# Patient Record
Sex: MALE | Race: ASIAN | NOT HISPANIC OR LATINO | URBAN - METROPOLITAN AREA
[De-identification: names, ages, dates, MRNs, and addresses within clinical notes are randomized per-mention and may not be internally consistent; named-entity substitution may affect disease eponyms.]

---

## 2017-04-12 ENCOUNTER — OUTPATIENT (OUTPATIENT)
Dept: OUTPATIENT SERVICES | Facility: HOSPITAL | Age: 62
LOS: 1 days | Discharge: HOME | End: 2017-04-12
Payer: MEDICAID

## 2017-04-12 DIAGNOSIS — Z41.9 ENCOUNTER FOR PROCEDURE FOR PURPOSES OTHER THAN REMEDYING HEALTH STATE, UNSPECIFIED: Chronic | ICD-10-CM

## 2017-04-12 DIAGNOSIS — R06.00 DYSPNEA, UNSPECIFIED: ICD-10-CM

## 2017-04-12 LAB
BUN SERPL-MCNC: 32 MG/DL — HIGH (ref 7–23)
CALCIUM SERPL-MCNC: 9.6 MG/DL — SIGNIFICANT CHANGE UP (ref 8.4–10.5)
CHLORIDE SERPL-SCNC: 104 MMOL/L — SIGNIFICANT CHANGE UP (ref 98–107)
CO2 SERPL-SCNC: 25 MMOL/L — SIGNIFICANT CHANGE UP (ref 22–31)
CREAT SERPL-MCNC: 1.32 MG/DL — HIGH (ref 0.5–1.3)
GLUCOSE SERPL-MCNC: 73 MG/DL — SIGNIFICANT CHANGE UP (ref 70–99)
HBA1C BLD-MCNC: 5.5 % — SIGNIFICANT CHANGE UP (ref 4–5.6)
HCT VFR BLD CALC: 41.6 % — SIGNIFICANT CHANGE UP (ref 39–50)
HGB BLD-MCNC: 13.5 G/DL — SIGNIFICANT CHANGE UP (ref 13–17)
MCHC RBC-ENTMCNC: 28 PG — SIGNIFICANT CHANGE UP (ref 27–34)
MCHC RBC-ENTMCNC: 32.5 % — SIGNIFICANT CHANGE UP (ref 32–36)
MCV RBC AUTO: 86.3 FL — SIGNIFICANT CHANGE UP (ref 80–100)
PLATELET # BLD AUTO: 214 K/UL — SIGNIFICANT CHANGE UP (ref 150–400)
PMV BLD: 9.8 FL — SIGNIFICANT CHANGE UP (ref 7–13)
POTASSIUM SERPL-MCNC: 4.2 MMOL/L — SIGNIFICANT CHANGE UP (ref 3.5–5.3)
POTASSIUM SERPL-SCNC: 4.2 MMOL/L — SIGNIFICANT CHANGE UP (ref 3.5–5.3)
RBC # BLD: 4.82 M/UL — SIGNIFICANT CHANGE UP (ref 4.2–5.8)
RBC # FLD: 15.7 % — HIGH (ref 10.3–14.5)
SODIUM SERPL-SCNC: 143 MMOL/L — SIGNIFICANT CHANGE UP (ref 135–145)
WBC # BLD: 8.77 K/UL — SIGNIFICANT CHANGE UP (ref 3.8–10.5)
WBC # FLD AUTO: 8.77 K/UL — SIGNIFICANT CHANGE UP (ref 3.8–10.5)

## 2017-04-12 PROCEDURE — 93458 L HRT ARTERY/VENTRICLE ANGIO: CPT | Mod: 26

## 2017-04-12 PROCEDURE — 93010 ELECTROCARDIOGRAM REPORT: CPT

## 2017-04-12 RX ORDER — SODIUM CHLORIDE 9 MG/ML
3 INJECTION INTRAMUSCULAR; INTRAVENOUS; SUBCUTANEOUS EVERY 8 HOURS
Qty: 0 | Refills: 0 | Status: DISCONTINUED | OUTPATIENT
Start: 2017-04-12 | End: 2017-04-27

## 2017-04-12 RX ORDER — SODIUM CHLORIDE 9 MG/ML
500 INJECTION INTRAMUSCULAR; INTRAVENOUS; SUBCUTANEOUS
Qty: 0 | Refills: 0 | Status: DISCONTINUED | OUTPATIENT
Start: 2017-04-12 | End: 2017-04-27

## 2017-04-12 RX ORDER — DEXTROSE 50 % IN WATER 50 %
1 SYRINGE (ML) INTRAVENOUS ONCE
Qty: 0 | Refills: 0 | Status: DISCONTINUED | OUTPATIENT
Start: 2017-04-12 | End: 2017-04-27

## 2017-04-12 RX ORDER — DEXTROSE 50 % IN WATER 50 %
25 SYRINGE (ML) INTRAVENOUS ONCE
Qty: 0 | Refills: 0 | Status: DISCONTINUED | OUTPATIENT
Start: 2017-04-12 | End: 2017-04-27

## 2017-04-12 RX ORDER — GLUCAGON INJECTION, SOLUTION 0.5 MG/.1ML
1 INJECTION, SOLUTION SUBCUTANEOUS ONCE
Qty: 0 | Refills: 0 | Status: DISCONTINUED | OUTPATIENT
Start: 2017-04-12 | End: 2017-04-27

## 2017-04-12 RX ORDER — DEXTROSE 50 % IN WATER 50 %
12.5 SYRINGE (ML) INTRAVENOUS ONCE
Qty: 0 | Refills: 0 | Status: DISCONTINUED | OUTPATIENT
Start: 2017-04-12 | End: 2017-04-27

## 2017-04-12 RX ORDER — DEXTROSE 50 % IN WATER 50 %
25 SYRINGE (ML) INTRAVENOUS ONCE
Qty: 0 | Refills: 0 | Status: COMPLETED | OUTPATIENT
Start: 2017-04-12 | End: 2017-04-12

## 2017-04-12 RX ORDER — SODIUM CHLORIDE 9 MG/ML
1000 INJECTION, SOLUTION INTRAVENOUS
Qty: 0 | Refills: 0 | Status: DISCONTINUED | OUTPATIENT
Start: 2017-04-12 | End: 2017-04-27

## 2017-04-12 RX ORDER — INSULIN LISPRO 100/ML
VIAL (ML) SUBCUTANEOUS
Qty: 0 | Refills: 0 | Status: DISCONTINUED | OUTPATIENT
Start: 2017-04-12 | End: 2017-04-27

## 2017-04-12 RX ADMIN — Medication 25 MILLILITER(S): at 14:26

## 2017-04-12 RX ADMIN — SODIUM CHLORIDE 75 MILLILITER(S): 9 INJECTION INTRAMUSCULAR; INTRAVENOUS; SUBCUTANEOUS at 17:30

## 2017-04-12 NOTE — H&P CARDIOLOGY - HISTORY OF PRESENT ILLNESS
61 year old male with HTN, hyperlipidemia, DM-II, known CAD with drug eluting stent mRCA 2016 who presented to his Cardiologist complaining of chest pain and SOB for the past 4months. Patient describes his symptoms as non-radiating chest squeezing walking up a flight of stairs, relieved with rest. Admits to an increase in fatigue and decrease in exercise tolerance.  Denies dizziness, syncope, edema, orthopnea.   Underwent a Nuclear Stress test with reported abnormal results.   In light of patients CAD history, symptoms and abnormal noninvasive test findings there is high suspicion for CAD progression. Patient is now referred to [facility] for a cardiac catheterization with possible PTCA/stent.

## 2017-04-12 NOTE — H&P CARDIOLOGY - PMH
CAD (coronary artery disease)    DM (diabetes mellitus)    Essential hypertension    Other hyperlipidemia    Stented coronary artery

## 2019-08-23 ENCOUNTER — OUTPATIENT (OUTPATIENT)
Dept: OUTPATIENT SERVICES | Facility: HOSPITAL | Age: 64
LOS: 1 days | Discharge: HOME | End: 2019-08-23
Payer: MEDICAID

## 2019-08-23 DIAGNOSIS — Z41.9 ENCOUNTER FOR PROCEDURE FOR PURPOSES OTHER THAN REMEDYING HEALTH STATE, UNSPECIFIED: Chronic | ICD-10-CM

## 2019-08-23 DIAGNOSIS — R94.39 ABNORMAL RESULT OF OTHER CARDIOVASCULAR FUNCTION STUDY: ICD-10-CM

## 2019-08-23 PROBLEM — Z82.3 FAMILY HISTORY OF CEREBROVASCULAR ACCIDENT (CVA): Status: ACTIVE | Noted: 2019-08-23

## 2019-08-23 PROBLEM — I25.10 CORONARY ARTERY DISEASE: Status: ACTIVE | Noted: 2019-08-23

## 2019-08-23 LAB
ANION GAP SERPL CALC-SCNC: 15 MMO/L — HIGH (ref 7–14)
BUN SERPL-MCNC: 22 MG/DL — SIGNIFICANT CHANGE UP (ref 7–23)
CALCIUM SERPL-MCNC: 9.3 MG/DL — SIGNIFICANT CHANGE UP (ref 8.4–10.5)
CHLORIDE SERPL-SCNC: 104 MMOL/L — SIGNIFICANT CHANGE UP (ref 98–107)
CO2 SERPL-SCNC: 20 MMOL/L — LOW (ref 22–31)
CREAT SERPL-MCNC: 1.1 MG/DL — SIGNIFICANT CHANGE UP (ref 0.5–1.3)
GLUCOSE BLDC GLUCOMTR-MCNC: 123 MG/DL — HIGH (ref 70–99)
GLUCOSE SERPL-MCNC: 132 MG/DL — HIGH (ref 70–99)
HBA1C BLD-MCNC: 6.1 % — HIGH (ref 4–5.6)
HCT VFR BLD CALC: 44.7 % — SIGNIFICANT CHANGE UP (ref 39–50)
HGB BLD-MCNC: 14.2 G/DL — SIGNIFICANT CHANGE UP (ref 13–17)
MCHC RBC-ENTMCNC: 27.6 PG — SIGNIFICANT CHANGE UP (ref 27–34)
MCHC RBC-ENTMCNC: 31.8 % — LOW (ref 32–36)
MCV RBC AUTO: 86.8 FL — SIGNIFICANT CHANGE UP (ref 80–100)
NRBC # FLD: 0 K/UL — SIGNIFICANT CHANGE UP (ref 0–0)
PLATELET # BLD AUTO: 208 K/UL — SIGNIFICANT CHANGE UP (ref 150–400)
PMV BLD: 9.3 FL — SIGNIFICANT CHANGE UP (ref 7–13)
POTASSIUM SERPL-MCNC: 4.4 MMOL/L — SIGNIFICANT CHANGE UP (ref 3.5–5.3)
POTASSIUM SERPL-SCNC: 4.4 MMOL/L — SIGNIFICANT CHANGE UP (ref 3.5–5.3)
RBC # BLD: 5.15 M/UL — SIGNIFICANT CHANGE UP (ref 4.2–5.8)
RBC # FLD: 14.1 % — SIGNIFICANT CHANGE UP (ref 10.3–14.5)
SODIUM SERPL-SCNC: 139 MMOL/L — SIGNIFICANT CHANGE UP (ref 135–145)
WBC # BLD: 6.92 K/UL — SIGNIFICANT CHANGE UP (ref 3.8–10.5)
WBC # FLD AUTO: 6.92 K/UL — SIGNIFICANT CHANGE UP (ref 3.8–10.5)

## 2019-08-23 PROCEDURE — 93571 IV DOP VEL&/PRESS C FLO 1ST: CPT | Mod: 26,LD

## 2019-08-23 PROCEDURE — 93572 IV DOP VEL&/PRESS C FLO EA: CPT | Mod: 26,RC

## 2019-08-23 PROCEDURE — 93010 ELECTROCARDIOGRAM REPORT: CPT | Mod: 76

## 2019-08-23 PROCEDURE — 99152 MOD SED SAME PHYS/QHP 5/>YRS: CPT

## 2019-08-23 PROCEDURE — 93458 L HRT ARTERY/VENTRICLE ANGIO: CPT | Mod: 26

## 2019-08-23 RX ORDER — ASPIRIN/CALCIUM CARB/MAGNESIUM 324 MG
81 TABLET ORAL ONCE
Refills: 0 | Status: COMPLETED | OUTPATIENT
Start: 2019-08-23 | End: 2019-08-23

## 2019-08-23 RX ORDER — METOPROLOL TARTRATE 50 MG
1 TABLET ORAL
Qty: 30 | Refills: 0 | DISCHARGE

## 2019-08-23 RX ORDER — SODIUM CHLORIDE 9 MG/ML
3 INJECTION INTRAMUSCULAR; INTRAVENOUS; SUBCUTANEOUS EVERY 8 HOURS
Refills: 0 | Status: DISCONTINUED | OUTPATIENT
Start: 2019-08-23 | End: 2019-09-11

## 2019-08-23 RX ORDER — CLOPIDOGREL BISULFATE 75 MG/1
75 TABLET, FILM COATED ORAL ONCE
Refills: 0 | Status: COMPLETED | OUTPATIENT
Start: 2019-08-23 | End: 2019-08-23

## 2019-08-23 RX ADMIN — CLOPIDOGREL BISULFATE 75 MILLIGRAM(S): 75 TABLET, FILM COATED ORAL at 09:33

## 2019-08-23 RX ADMIN — Medication 81 MILLIGRAM(S): at 09:31

## 2019-08-23 RX ADMIN — CLOPIDOGREL BISULFATE 75 MILLIGRAM(S): 75 TABLET, FILM COATED ORAL at 09:41

## 2019-08-23 NOTE — H&P CARDIOLOGY - RS GEN PE MLT RESP DETAILS PC
respirations non-labored/airway patent/no chest wall tenderness/clear to auscultation bilaterally/breath sounds equal/good air movement

## 2019-08-23 NOTE — H&P CARDIOLOGY - HISTORY OF PRESENT ILLNESS
62 y/o M w/ PMH of HTN, HLD, DM-II, GERD and CAD s/p STEPHANE(mRCA 2016) presents for cardiac catheretization. Pt states that he has been experiencing exertional chest pain dyspnea for the past 3 months. Pt's symptoms are relived with a few minutes of rest. Pt was sent for a stress test which was positive for ischemia in the lateral, inferolateral, apical and apical inferior walls with EF 53%. Pt denies N/V/D, fevers, chills, cough, palpitations, syncope, orthopnea, nocturnal paroxysmal dyspnea, edema, cyanosis, heart murmurs, varicosities, phlebitis, claudication.

## 2019-08-26 ENCOUNTER — APPOINTMENT (OUTPATIENT)
Dept: CARDIOTHORACIC SURGERY | Facility: CLINIC | Age: 64
End: 2019-08-26
Payer: MEDICAID

## 2019-08-26 ENCOUNTER — OUTPATIENT (OUTPATIENT)
Dept: OUTPATIENT SERVICES | Facility: HOSPITAL | Age: 64
LOS: 1 days | End: 2019-08-26
Payer: MEDICAID

## 2019-08-26 VITALS
DIASTOLIC BLOOD PRESSURE: 85 MMHG | RESPIRATION RATE: 18 BRPM | WEIGHT: 175.93 LBS | TEMPERATURE: 99 F | HEART RATE: 87 BPM | SYSTOLIC BLOOD PRESSURE: 143 MMHG | OXYGEN SATURATION: 98 % | HEIGHT: 65 IN

## 2019-08-26 DIAGNOSIS — I25.10 ATHEROSCLEROTIC HEART DISEASE OF NATIVE CORONARY ARTERY W/OUT ANGINA PECTORIS: ICD-10-CM

## 2019-08-26 DIAGNOSIS — N49.2 INFLAMMATORY DISORDERS OF SCROTUM: Chronic | ICD-10-CM

## 2019-08-26 DIAGNOSIS — I25.10 ATHEROSCLEROTIC HEART DISEASE OF NATIVE CORONARY ARTERY WITHOUT ANGINA PECTORIS: ICD-10-CM

## 2019-08-26 DIAGNOSIS — Z98.890 OTHER SPECIFIED POSTPROCEDURAL STATES: Chronic | ICD-10-CM

## 2019-08-26 DIAGNOSIS — Z41.9 ENCOUNTER FOR PROCEDURE FOR PURPOSES OTHER THAN REMEDYING HEALTH STATE, UNSPECIFIED: Chronic | ICD-10-CM

## 2019-08-26 DIAGNOSIS — E11.9 TYPE 2 DIABETES MELLITUS WITHOUT COMPLICATIONS: ICD-10-CM

## 2019-08-26 DIAGNOSIS — Z82.3 FAMILY HISTORY OF STROKE: ICD-10-CM

## 2019-08-26 DIAGNOSIS — Z29.9 ENCOUNTER FOR PROPHYLACTIC MEASURES, UNSPECIFIED: ICD-10-CM

## 2019-08-26 LAB
ANION GAP SERPL CALC-SCNC: 14 MMOL/L — SIGNIFICANT CHANGE UP (ref 5–17)
BLD GP AB SCN SERPL QL: NEGATIVE — SIGNIFICANT CHANGE UP
BUN SERPL-MCNC: 25 MG/DL — HIGH (ref 7–23)
CALCIUM SERPL-MCNC: 10 MG/DL — SIGNIFICANT CHANGE UP (ref 8.4–10.5)
CHLORIDE SERPL-SCNC: 103 MMOL/L — SIGNIFICANT CHANGE UP (ref 96–108)
CO2 SERPL-SCNC: 23 MMOL/L — SIGNIFICANT CHANGE UP (ref 22–31)
CREAT SERPL-MCNC: 1.25 MG/DL — SIGNIFICANT CHANGE UP (ref 0.5–1.3)
GLUCOSE SERPL-MCNC: 110 MG/DL — HIGH (ref 70–99)
PA ADP PRP-ACNC: 102 PRU
POTASSIUM SERPL-MCNC: 4.4 MMOL/L — SIGNIFICANT CHANGE UP (ref 3.5–5.3)
POTASSIUM SERPL-SCNC: 4.4 MMOL/L — SIGNIFICANT CHANGE UP (ref 3.5–5.3)
RH IG SCN BLD-IMP: POSITIVE — SIGNIFICANT CHANGE UP
SODIUM SERPL-SCNC: 140 MMOL/L — SIGNIFICANT CHANGE UP (ref 135–145)

## 2019-08-26 PROCEDURE — 71046 X-RAY EXAM CHEST 2 VIEWS: CPT

## 2019-08-26 PROCEDURE — 80048 BASIC METABOLIC PNL TOTAL CA: CPT

## 2019-08-26 PROCEDURE — G0463: CPT

## 2019-08-26 PROCEDURE — 71046 X-RAY EXAM CHEST 2 VIEWS: CPT | Mod: 26

## 2019-08-26 PROCEDURE — 86850 RBC ANTIBODY SCREEN: CPT

## 2019-08-26 PROCEDURE — 99202 OFFICE O/P NEW SF 15 MIN: CPT

## 2019-08-26 PROCEDURE — 87640 STAPH A DNA AMP PROBE: CPT

## 2019-08-26 PROCEDURE — 86901 BLOOD TYPING SEROLOGIC RH(D): CPT

## 2019-08-26 PROCEDURE — 86900 BLOOD TYPING SEROLOGIC ABO: CPT

## 2019-08-26 PROCEDURE — 87641 MR-STAPH DNA AMP PROBE: CPT

## 2019-08-26 NOTE — H&P PST ADULT - NSICDXPASTMEDICALHX_GEN_ALL_CORE_FT
PAST MEDICAL HISTORY:  CAD (coronary artery disease)     DM (diabetes mellitus)     Essential hypertension     Other hyperlipidemia     Stented coronary artery PAST MEDICAL HISTORY:  CAD (coronary artery disease) 2 stents - 2016    DM (diabetes mellitus)     Essential hypertension     Other hyperlipidemia     Stented coronary artery

## 2019-08-26 NOTE — H&P PST ADULT - NSICDXPASTSURGICALHX_GEN_ALL_CORE_FT
PAST SURGICAL HISTORY:  Elective surgical procedure RLE metal jessica in place- 2006    S/P cardiac cath 8/23/19    Scrotal abscess s/p I& D- 2017

## 2019-08-26 NOTE — H&P PST ADULT - RS GEN PE MLT RESP DETAILS PC
airway patent/no chest wall tenderness/respirations non-labored/good air movement/clear to auscultation bilaterally/breath sounds equal

## 2019-08-26 NOTE — H&P PST ADULT - HISTORY OF PRESENT ILLNESS
62 y/o M w/ PMH of HTN, HLD, DM-II, GERD and CAD s/p STEPHANE (mRCA 2016)  Pt states that he has been experiencing exertional chest pain dyspnea for the past 3 months. Pt's symptoms are relived with a few minutes of rest. Pt was sent for a stress test which was positive for ischemia in the lateral, inferolateral, apical and apical inferior walls with EF 53%. Pt denies N/V/D, fevers, chills, cough, palpitations, syncope, orthopnea, nocturnal paroxysmal dyspnea, edema, cyanosis, heart murmurs, varicosities, phlebitis, claudication. 64 y/o M w/ PMH of HTN, HLD, DM-II, GERD and CAD s/p STEPHANE (mRCA 2016) on Plavix and aspirin  . Pt states that he has been experiencing exertional chest pain dyspnea for the past 3 months. Pt's symptoms are relived with a few minutes of rest. Pt was seen by cardiology  sent for a stress test which was positive for ischemia in the lateral, inferolateral, apical and apical inferior walls with EF 53%. Pt denies N/V/D, fevers, chills, cough, palpitations, syncope, orthopnea, nocturnal paroxysmal dyspnea, edema, cyanosis, heart murmurs, varicosities, phlebitis, claudication. Pt was evaluated by CT surgery s/p cardiac cath dx multivessel disease . Presents to PST for scheduled  Coronary Artery Bypass Grafting x 3 on 8/27/19 ( as per family surgery was rescheduled no date at this time) .

## 2019-08-26 NOTE — H&P PST ADULT - ASSESSMENT
CAPRINI SCORE [CLOT updated 18]    AGE RELATED RISK FACTORS                                                       MOBILITY RELATED FACTORS  [ ] Age 41-60 years                                            (1 Point)                    [ ] Bed rest                                                        (1 Point)  [x ] Age: 61-74 years                                           (2 Points)                  [ ] Plaster cast                                                   (2 Points)  [ ] Age= 75 years                                              (3 Points)                    [ ] Bed bound for more than 72 hours                 (2 Points)    DISEASE RELATED RISK FACTORS                                               GENDER SPECIFIC FACTORS  [ ] Edema in the lower extremities                       (1 Point)              [ ] Pregnancy                                                     (1 Point)  [ ] Varicose veins                                               (1 Point)                     [ ] Post-partum < 6 weeks                                   (1 Point)             [ x] BMI > 25 Kg/m2                                            (1 Point)                     [ ] Hormonal therapy  or oral contraception          (1 Point)                 [ ] Sepsis (in the previous month)                        (1 Point)               [ ] History of pregnancy complications                 (1 point)  [ ] Pneumonia or serious lung disease                                               [ ] Unexplained or recurrent                     (1 Point)           (in the previous month)                               (1 Point)  [ ] Abnormal pulmonary function test                     (1 Point)                 SURGERY RELATED RISK FACTORS  [ ] Acute myocardial infarction                              (1 Point)               [ ]  Section                                             (1 Point)  [ ] Congestive heart failure (in the previous month)  (1 Point)      [ ] Minor surgery                                                  (1 Point)   [ ] Inflammatory bowel disease                             (1 Point)               [ ] Arthroscopic surgery                                        (2 Points)  [ ] Central venous access                                      (2 Points)                [x ] General surgery lasting more than 45 minutes (2 points)  [ ] Present or previous malignancy                     (2 Points)                [ ] Elective arthroplasty                                         (5 points)    [ ] Stroke (in the previous month)                          (5 Points)                                                                                                                                                           HEMATOLOGY RELATED FACTORS                                                 TRAUMA RELATED RISK FACTORS  [ ] Prior episodes of VTE                                     (3 Points)                [ ] Fracture of the hip, pelvis, or leg                       (5 Points)  [ ] Positive family history for VTE                         (3 Points)             [ ] Acute spinal cord injury (in the previous month)  (5 Points)  [ ] Prothrombin 23496 A                                     (3 Points)               [ ] Paralysis  (less than 1 month)                             (5 Points)  [ ] Factor V Leiden                                             (3 Points)                  [ ] Multiple Trauma within 1 month                        (5 Points)  [ ] Lupus anticoagulants                                     (3 Points)                                                           [ ] Anticardiolipin antibodies                               (3 Points)                                                       [ ] High homocysteine in the blood                      (3 Points)                                             [ ] Other congenital or acquired thrombophilia      (3 Points)                                                [ ] Heparin induced thrombocytopenia                  (3 Points)                                     Total Score [   5       ]

## 2019-08-26 NOTE — H&P PST ADULT - NSICDXPROBLEM_GEN_ALL_CORE_FT
PROBLEM DIAGNOSES  Problem: 3-vessel coronary artery disease  Assessment and Plan: Coronary Artery Bypass Grafting x 3   Pre- Op Instructions discussed   BMP, Type and screen, MRSA sent ,cXR done   pt will call CT surgery tomorrow regarding Plavix plan ans Surgery date       Problem: Need for prophylactic measure  Assessment and Plan: The Caprini score indicates this patient is at risk for a VTE event (score 3-5).  Most surgical patients in this group would benefit from pharmacologic prophylaxis.  The surgical team will determine the balance between VTE risk and bleeding risk      Problem: DM (diabetes mellitus)  Assessment and Plan: pt will continue current medication   F/s the DOS   No diabetic medication the DOS     BLANCA  OR booking notified  BLANCA Precautions

## 2019-08-26 NOTE — PHYSICAL EXAM
[General Appearance - Alert] : alert [General Appearance - In No Acute Distress] : in no acute distress [Outer Ear] : the ears and nose were normal in appearance [Sclera] : the sclera and conjunctiva were normal [Neck Appearance] : the appearance of the neck was normal [Jugular Venous Distention Increased] : there was no jugular-venous distention [] : no respiratory distress [Examination Of The Chest] : the chest was normal in appearance [Exaggerated Use Of Accessory Muscles For Inspiration] : no accessory muscle use [Respiration, Rhythm And Depth] : normal respiratory rhythm and effort [Abnormal Walk] : normal gait [Nail Clubbing] : no clubbing  or cyanosis of the fingernails [Oriented To Time, Place, And Person] : oriented to person, place, and time [FreeTextEntry1] : No edema

## 2019-08-26 NOTE — HISTORY OF PRESENT ILLNESS
[FreeTextEntry1] : 64 y/o gentleman with DM, hypercholesterolemia, obesity and hypertension. H/O PCI with multiple stents in RCA in 4/2016.\par Presents with epigastric burning and a positive stress test.

## 2019-08-26 NOTE — H&P PST ADULT - NSANTHOSAYNRD_GEN_A_CORE
No. BLANCA screening performed.  STOP BANG Legend: 0-2 = LOW Risk; 3-4 = INTERMEDIATE Risk; 5-8 = HIGH Risk criteria/No. BLANCA screening performed.  STOP BANG Legend: 0-2 = LOW Risk; 3-4 = INTERMEDIATE Risk; 5-8 = HIGH Risk

## 2019-08-26 NOTE — CONSULT LETTER
[Dear  ___] : Dear ~ALEX, [Courtesy Letter:] : I had the pleasure of seeing your patient, [unfilled], in my office today. [Please see my note below.] : Please see my note below. [Consult Closing:] : Thank you very much for allowing me to participate in the care of this patient.  If you have any questions, please do not hesitate to contact me. [Sincerely,] : Sincerely, [FreeTextEntry2] : Kaylee Toro MD\par [address] [place] [address]\par [place] [phone] [FreeTextEntry3] : Chaitanya Hope MD\par Attending Surgeon \par [facility]\par  \par Cardiovascular & Thoracic Surgery\par [facility] [employer]\par

## 2019-08-26 NOTE — ASSESSMENT
[FreeTextEntry1] : 62 y/o with multiple risk factors as above.  S/PCI.\par Now unstable angina and positive stress test.\par Cath demonstrates critical triple vessel disease.\par \par Concur with recommendation for CABG in this diabetic with multivessel disease, critical stenoses and previous PCI.\par Op. risk 1-2%. Risk of stroke or brain injury 1-2%. Other issues related to bleeding, infection and poor wound healing discussed. All questions answered.\par

## 2019-08-27 LAB
MRSA PCR RESULT.: SIGNIFICANT CHANGE UP
S AUREUS DNA NOSE QL NAA+PROBE: SIGNIFICANT CHANGE UP

## 2019-09-01 ENCOUNTER — OUTPATIENT (OUTPATIENT)
Dept: OUTPATIENT SERVICES | Facility: HOSPITAL | Age: 64
LOS: 1 days | End: 2019-09-01
Payer: MEDICAID

## 2019-09-01 DIAGNOSIS — Z98.890 OTHER SPECIFIED POSTPROCEDURAL STATES: Chronic | ICD-10-CM

## 2019-09-01 DIAGNOSIS — N49.2 INFLAMMATORY DISORDERS OF SCROTUM: Chronic | ICD-10-CM

## 2019-09-01 DIAGNOSIS — Z41.9 ENCOUNTER FOR PROCEDURE FOR PURPOSES OTHER THAN REMEDYING HEALTH STATE, UNSPECIFIED: Chronic | ICD-10-CM

## 2019-09-01 PROCEDURE — G9001: CPT

## 2019-09-03 ENCOUNTER — APPOINTMENT (OUTPATIENT)
Dept: CARDIOTHORACIC SURGERY | Facility: HOSPITAL | Age: 64
End: 2019-09-03

## 2019-09-03 ENCOUNTER — INPATIENT (INPATIENT)
Facility: HOSPITAL | Age: 64
LOS: 4 days | Discharge: HOME | DRG: 235 | End: 2019-09-08
Attending: THORACIC SURGERY (CARDIOTHORACIC VASCULAR SURGERY) | Admitting: THORACIC SURGERY (CARDIOTHORACIC VASCULAR SURGERY)
Payer: MEDICAID

## 2019-09-03 VITALS
RESPIRATION RATE: 18 BRPM | TEMPERATURE: 98 F | DIASTOLIC BLOOD PRESSURE: 73 MMHG | WEIGHT: 178.79 LBS | SYSTOLIC BLOOD PRESSURE: 110 MMHG | OXYGEN SATURATION: 98 % | HEART RATE: 65 BPM | HEIGHT: 65 IN

## 2019-09-03 DIAGNOSIS — Z41.9 ENCOUNTER FOR PROCEDURE FOR PURPOSES OTHER THAN REMEDYING HEALTH STATE, UNSPECIFIED: Chronic | ICD-10-CM

## 2019-09-03 DIAGNOSIS — I25.10 ATHEROSCLEROTIC HEART DISEASE OF NATIVE CORONARY ARTERY WITHOUT ANGINA PECTORIS: ICD-10-CM

## 2019-09-03 DIAGNOSIS — N49.2 INFLAMMATORY DISORDERS OF SCROTUM: Chronic | ICD-10-CM

## 2019-09-03 DIAGNOSIS — Z98.890 OTHER SPECIFIED POSTPROCEDURAL STATES: Chronic | ICD-10-CM

## 2019-09-03 LAB
ALBUMIN SERPL ELPH-MCNC: 3.2 G/DL — LOW (ref 3.3–5)
ALP SERPL-CCNC: 34 U/L — LOW (ref 40–120)
ALT FLD-CCNC: 17 U/L — SIGNIFICANT CHANGE UP (ref 10–45)
ANION GAP SERPL CALC-SCNC: 19 MMOL/L — HIGH (ref 5–17)
APTT BLD: 30 SEC — SIGNIFICANT CHANGE UP (ref 27.5–36.3)
AST SERPL-CCNC: 62 U/L — HIGH (ref 10–40)
BASE EXCESS BLDV CALC-SCNC: -0.4 MMOL/L — SIGNIFICANT CHANGE UP (ref -2–2)
BASE EXCESS BLDV CALC-SCNC: -0.6 MMOL/L — SIGNIFICANT CHANGE UP (ref -2–2)
BASE EXCESS BLDV CALC-SCNC: -3.7 MMOL/L — LOW (ref -2–2)
BASE EXCESS BLDV CALC-SCNC: 0.2 MMOL/L — SIGNIFICANT CHANGE UP (ref -2–2)
BASE EXCESS BLDV CALC-SCNC: 0.3 MMOL/L — SIGNIFICANT CHANGE UP (ref -2–2)
BASE EXCESS BLDV CALC-SCNC: 1 MMOL/L — SIGNIFICANT CHANGE UP (ref -2–2)
BASE EXCESS BLDV CALC-SCNC: 2.4 MMOL/L — HIGH (ref -2–2)
BASOPHILS # BLD AUTO: 0 K/UL — SIGNIFICANT CHANGE UP (ref 0–0.2)
BASOPHILS NFR BLD AUTO: 0.2 % — SIGNIFICANT CHANGE UP (ref 0–2)
BILIRUB SERPL-MCNC: 0.8 MG/DL — SIGNIFICANT CHANGE UP (ref 0.2–1.2)
BLOOD GAS VENOUS - CREATININE: SIGNIFICANT CHANGE UP MG/DL (ref 0.5–1.3)
BUN SERPL-MCNC: 16 MG/DL — SIGNIFICANT CHANGE UP (ref 7–23)
CA-I SERPL-SCNC: 0.85 MMOL/L — LOW (ref 1.12–1.3)
CA-I SERPL-SCNC: 0.87 MMOL/L — LOW (ref 1.12–1.3)
CA-I SERPL-SCNC: 0.87 MMOL/L — LOW (ref 1.12–1.3)
CA-I SERPL-SCNC: 0.88 MMOL/L — LOW (ref 1.12–1.3)
CA-I SERPL-SCNC: 0.92 MMOL/L — LOW (ref 1.12–1.3)
CA-I SERPL-SCNC: 1.17 MMOL/L — SIGNIFICANT CHANGE UP (ref 1.12–1.3)
CALCIUM SERPL-MCNC: 7.6 MG/DL — LOW (ref 8.4–10.5)
CHLORIDE BLDV-SCNC: SIGNIFICANT CHANGE UP MMOL/L (ref 96–108)
CHLORIDE SERPL-SCNC: 103 MMOL/L — SIGNIFICANT CHANGE UP (ref 96–108)
CK MB BLD-MCNC: 3.9 % — HIGH (ref 0–3.5)
CK MB CFR SERPL CALC: 38.4 NG/ML — HIGH (ref 0–6.7)
CK SERPL-CCNC: 985 U/L — HIGH (ref 30–200)
CO2 BLDV-SCNC: 24 MMOL/L — SIGNIFICANT CHANGE UP (ref 22–30)
CO2 SERPL-SCNC: 21 MMOL/L — LOW (ref 22–31)
CREAT SERPL-MCNC: 1 MG/DL — SIGNIFICANT CHANGE UP (ref 0.5–1.3)
EOSINOPHIL # BLD AUTO: 0.1 K/UL — SIGNIFICANT CHANGE UP (ref 0–0.5)
EOSINOPHIL NFR BLD AUTO: 0.7 % — SIGNIFICANT CHANGE UP (ref 0–6)
FIBRINOGEN PPP-MCNC: 227 MG/DL — LOW (ref 350–510)
GAS PNL BLDA: SIGNIFICANT CHANGE UP
GAS PNL BLDV: 132 MMOL/L — LOW (ref 135–145)
GAS PNL BLDV: 133 MMOL/L — LOW (ref 135–145)
GAS PNL BLDV: 135 MMOL/L — SIGNIFICANT CHANGE UP (ref 135–145)
GAS PNL BLDV: 137 MMOL/L — SIGNIFICANT CHANGE UP (ref 135–145)
GAS PNL BLDV: SIGNIFICANT CHANGE UP
GLUCOSE BLDC GLUCOMTR-MCNC: 106 MG/DL — HIGH (ref 70–99)
GLUCOSE BLDC GLUCOMTR-MCNC: 111 MG/DL — HIGH (ref 70–99)
GLUCOSE BLDC GLUCOMTR-MCNC: 133 MG/DL — HIGH (ref 70–99)
GLUCOSE BLDV-MCNC: 138 MG/DL — HIGH (ref 70–99)
GLUCOSE BLDV-MCNC: 233 MG/DL — HIGH (ref 70–99)
GLUCOSE BLDV-MCNC: 240 MG/DL — HIGH (ref 70–99)
GLUCOSE BLDV-MCNC: 275 MG/DL — HIGH (ref 70–99)
GLUCOSE BLDV-MCNC: 285 MG/DL — HIGH (ref 70–99)
GLUCOSE BLDV-MCNC: 297 MG/DL — HIGH (ref 70–99)
GLUCOSE SERPL-MCNC: 128 MG/DL — HIGH (ref 70–99)
HCO3 BLDV-SCNC: 23 MMOL/L — SIGNIFICANT CHANGE UP (ref 21–29)
HCO3 BLDV-SCNC: 25 MMOL/L — SIGNIFICANT CHANGE UP (ref 21–29)
HCO3 BLDV-SCNC: 26 MMOL/L — SIGNIFICANT CHANGE UP (ref 21–29)
HCO3 BLDV-SCNC: 27 MMOL/L — SIGNIFICANT CHANGE UP (ref 21–29)
HCT VFR BLD CALC: 28.7 % — LOW (ref 39–50)
HCT VFR BLDA CALC: 23 % — LOW (ref 39–50)
HCT VFR BLDA CALC: 24 % — LOW (ref 39–50)
HCT VFR BLDA CALC: 25 % — LOW (ref 39–50)
HCT VFR BLDA CALC: 26 % — LOW (ref 39–50)
HCT VFR BLDA CALC: 28 % — LOW (ref 39–50)
HCT VFR BLDA CALC: 28 % — LOW (ref 39–50)
HGB BLD CALC-MCNC: 7.3 G/DL — LOW (ref 13–17)
HGB BLD CALC-MCNC: 7.5 G/DL — LOW (ref 13–17)
HGB BLD CALC-MCNC: 8.2 G/DL — LOW (ref 13–17)
HGB BLD CALC-MCNC: 8.3 G/DL — LOW (ref 13–17)
HGB BLD CALC-MCNC: 8.9 G/DL — LOW (ref 13–17)
HGB BLD CALC-MCNC: 9 G/DL — LOW (ref 13–17)
HGB BLD-MCNC: 9.1 G/DL — LOW (ref 13–17)
HOROWITZ INDEX BLDV+IHG-RTO: 0 — SIGNIFICANT CHANGE UP
HOROWITZ INDEX BLDV+IHG-RTO: 40 — SIGNIFICANT CHANGE UP
INR BLD: 1.43 RATIO — HIGH (ref 0.88–1.16)
LACTATE BLDV-MCNC: 2.4 MMOL/L — HIGH (ref 0.7–2)
LACTATE BLDV-MCNC: 3.5 MMOL/L — HIGH (ref 0.7–2)
LACTATE BLDV-MCNC: 3.6 MMOL/L — HIGH (ref 0.7–2)
LACTATE BLDV-MCNC: 4.1 MMOL/L — CRITICAL HIGH (ref 0.7–2)
LACTATE BLDV-MCNC: 4.5 MMOL/L — CRITICAL HIGH (ref 0.7–2)
LACTATE BLDV-MCNC: 5 MMOL/L — CRITICAL HIGH (ref 0.7–2)
LYMPHOCYTES # BLD AUTO: 1.3 K/UL — SIGNIFICANT CHANGE UP (ref 1–3.3)
LYMPHOCYTES # BLD AUTO: 7.9 % — LOW (ref 13–44)
MAGNESIUM SERPL-MCNC: 1.8 MG/DL — SIGNIFICANT CHANGE UP (ref 1.6–2.6)
MCHC RBC-ENTMCNC: 27.9 PG — SIGNIFICANT CHANGE UP (ref 27–34)
MCHC RBC-ENTMCNC: 31.7 GM/DL — LOW (ref 32–36)
MCV RBC AUTO: 87.9 FL — SIGNIFICANT CHANGE UP (ref 80–100)
MONOCYTES # BLD AUTO: 0.8 K/UL — SIGNIFICANT CHANGE UP (ref 0–0.9)
MONOCYTES NFR BLD AUTO: 5 % — SIGNIFICANT CHANGE UP (ref 2–14)
NEUTROPHILS # BLD AUTO: 13.9 K/UL — HIGH (ref 1.8–7.4)
NEUTROPHILS NFR BLD AUTO: 86.3 % — HIGH (ref 43–77)
PCO2 BLDV: 44 MMHG — SIGNIFICANT CHANGE UP (ref 35–50)
PCO2 BLDV: 45 MMHG — SIGNIFICANT CHANGE UP (ref 35–50)
PCO2 BLDV: 47 MMHG — SIGNIFICANT CHANGE UP (ref 35–50)
PCO2 BLDV: 52 MMHG — HIGH (ref 35–50)
PCO2 BLDV: 53 MMHG — HIGH (ref 35–50)
PH BLDV: 7.26 — LOW (ref 7.35–7.45)
PH BLDV: 7.31 — LOW (ref 7.35–7.45)
PH BLDV: 7.34 — LOW (ref 7.35–7.45)
PH BLDV: 7.37 — SIGNIFICANT CHANGE UP (ref 7.35–7.45)
PH BLDV: 7.37 — SIGNIFICANT CHANGE UP (ref 7.35–7.45)
PH BLDV: 7.38 — SIGNIFICANT CHANGE UP (ref 7.35–7.45)
PH BLDV: 7.4 — SIGNIFICANT CHANGE UP (ref 7.35–7.45)
PHOSPHATE SERPL-MCNC: 2.3 MG/DL — LOW (ref 2.5–4.5)
PLATELET # BLD AUTO: 182 K/UL — SIGNIFICANT CHANGE UP (ref 150–400)
PO2 BLDV: 42 MMHG — SIGNIFICANT CHANGE UP (ref 25–45)
PO2 BLDV: 51 MMHG — HIGH (ref 25–45)
PO2 BLDV: 52 MMHG — HIGH (ref 25–45)
PO2 BLDV: 52 MMHG — HIGH (ref 25–45)
PO2 BLDV: 57 MMHG — HIGH (ref 25–45)
PO2 BLDV: 58 MMHG — HIGH (ref 25–45)
PO2 BLDV: 58 MMHG — HIGH (ref 25–45)
POTASSIUM BLDV-SCNC: 4.5 MMOL/L — SIGNIFICANT CHANGE UP (ref 3.5–5)
POTASSIUM BLDV-SCNC: 4.7 MMOL/L — SIGNIFICANT CHANGE UP (ref 3.5–5)
POTASSIUM BLDV-SCNC: 5.2 MMOL/L — HIGH (ref 3.5–5)
POTASSIUM BLDV-SCNC: 5.2 MMOL/L — HIGH (ref 3.5–5)
POTASSIUM BLDV-SCNC: 5.3 MMOL/L — HIGH (ref 3.5–5)
POTASSIUM BLDV-SCNC: 5.7 MMOL/L — HIGH (ref 3.5–5)
POTASSIUM SERPL-MCNC: 3.8 MMOL/L — SIGNIFICANT CHANGE UP (ref 3.5–5.3)
POTASSIUM SERPL-SCNC: 3.8 MMOL/L — SIGNIFICANT CHANGE UP (ref 3.5–5.3)
PROT SERPL-MCNC: 4.4 G/DL — LOW (ref 6–8.3)
PROTHROM AB SERPL-ACNC: 16.5 SEC — HIGH (ref 10–12.9)
RBC # BLD: 3.27 M/UL — LOW (ref 4.2–5.8)
RBC # FLD: 12.6 % — SIGNIFICANT CHANGE UP (ref 10.3–14.5)
RH IG SCN BLD-IMP: POSITIVE — SIGNIFICANT CHANGE UP
SAO2 % BLDV: 72 % — SIGNIFICANT CHANGE UP (ref 67–88)
SAO2 % BLDV: 80 % — SIGNIFICANT CHANGE UP (ref 67–88)
SAO2 % BLDV: 83 % — SIGNIFICANT CHANGE UP (ref 67–88)
SAO2 % BLDV: 83 % — SIGNIFICANT CHANGE UP (ref 67–88)
SAO2 % BLDV: 87 % — SIGNIFICANT CHANGE UP (ref 67–88)
SAO2 % BLDV: 88 % — SIGNIFICANT CHANGE UP (ref 67–88)
SAO2 % BLDV: 89 % — HIGH (ref 67–88)
SODIUM SERPL-SCNC: 143 MMOL/L — SIGNIFICANT CHANGE UP (ref 135–145)
TROPONIN T, HIGH SENSITIVITY RESULT: 851 NG/L — HIGH (ref 0–51)
WBC # BLD: 16.1 K/UL — HIGH (ref 3.8–10.5)
WBC # FLD AUTO: 16.1 K/UL — HIGH (ref 3.8–10.5)

## 2019-09-03 PROCEDURE — 93010 ELECTROCARDIOGRAM REPORT: CPT

## 2019-09-03 PROCEDURE — 71045 X-RAY EXAM CHEST 1 VIEW: CPT | Mod: 26,77

## 2019-09-03 PROCEDURE — 71045 X-RAY EXAM CHEST 1 VIEW: CPT | Mod: 26

## 2019-09-03 PROCEDURE — 35600 OPEN HRV UXTR ART 1 SGM CAB: CPT | Mod: AS

## 2019-09-03 PROCEDURE — 33534 CABG ARTERIAL TWO: CPT

## 2019-09-03 PROCEDURE — 33508 ENDOSCOPIC VEIN HARVEST: CPT

## 2019-09-03 PROCEDURE — 33518 CABG ARTERY-VEIN TWO: CPT | Mod: AS

## 2019-09-03 PROCEDURE — 99291 CRITICAL CARE FIRST HOUR: CPT

## 2019-09-03 PROCEDURE — 35600 OPEN HRV UXTR ART 1 SGM CAB: CPT

## 2019-09-03 PROCEDURE — 33508 ENDOSCOPIC VEIN HARVEST: CPT | Mod: AS

## 2019-09-03 PROCEDURE — 33518 CABG ARTERY-VEIN TWO: CPT

## 2019-09-03 PROCEDURE — 33534 CABG ARTERIAL TWO: CPT | Mod: AS

## 2019-09-03 RX ORDER — ALBUMIN HUMAN 25 %
250 VIAL (ML) INTRAVENOUS
Refills: 0 | Status: COMPLETED | OUTPATIENT
Start: 2019-09-03 | End: 2019-09-03

## 2019-09-03 RX ORDER — NICARDIPINE HYDROCHLORIDE 30 MG/1
5 CAPSULE, EXTENDED RELEASE ORAL
Qty: 40 | Refills: 0 | Status: DISCONTINUED | OUTPATIENT
Start: 2019-09-03 | End: 2019-09-03

## 2019-09-03 RX ORDER — NICARDIPINE HYDROCHLORIDE 30 MG/1
5 CAPSULE, EXTENDED RELEASE ORAL
Qty: 40 | Refills: 0 | Status: DISCONTINUED | OUTPATIENT
Start: 2019-09-03 | End: 2019-09-04

## 2019-09-03 RX ORDER — POTASSIUM CHLORIDE 20 MEQ
10 PACKET (EA) ORAL
Refills: 0 | Status: COMPLETED | OUTPATIENT
Start: 2019-09-03 | End: 2019-09-03

## 2019-09-03 RX ORDER — HYDROMORPHONE HYDROCHLORIDE 2 MG/ML
0.5 INJECTION INTRAMUSCULAR; INTRAVENOUS; SUBCUTANEOUS ONCE
Refills: 0 | Status: DISCONTINUED | OUTPATIENT
Start: 2019-09-03 | End: 2019-09-03

## 2019-09-03 RX ORDER — PROPOFOL 10 MG/ML
25 INJECTION, EMULSION INTRAVENOUS
Qty: 1000 | Refills: 0 | Status: DISCONTINUED | OUTPATIENT
Start: 2019-09-03 | End: 2019-09-04

## 2019-09-03 RX ORDER — CHLORHEXIDINE GLUCONATE 213 G/1000ML
1 SOLUTION TOPICAL DAILY
Refills: 0 | Status: DISCONTINUED | OUTPATIENT
Start: 2019-09-03 | End: 2019-09-03

## 2019-09-03 RX ORDER — DEXTROSE 50 % IN WATER 50 %
50 SYRINGE (ML) INTRAVENOUS
Refills: 0 | Status: DISCONTINUED | OUTPATIENT
Start: 2019-09-03 | End: 2019-09-08

## 2019-09-03 RX ORDER — ALBUMIN HUMAN 25 %
250 VIAL (ML) INTRAVENOUS ONCE
Refills: 0 | Status: COMPLETED | OUTPATIENT
Start: 2019-09-03 | End: 2019-09-03

## 2019-09-03 RX ORDER — SODIUM CHLORIDE 9 MG/ML
1000 INJECTION INTRAMUSCULAR; INTRAVENOUS; SUBCUTANEOUS
Refills: 0 | Status: DISCONTINUED | OUTPATIENT
Start: 2019-09-03 | End: 2019-09-08

## 2019-09-03 RX ORDER — SODIUM CHLORIDE 9 MG/ML
3 INJECTION INTRAMUSCULAR; INTRAVENOUS; SUBCUTANEOUS EVERY 8 HOURS
Refills: 0 | Status: DISCONTINUED | OUTPATIENT
Start: 2019-09-03 | End: 2019-09-03

## 2019-09-03 RX ORDER — PANTOPRAZOLE SODIUM 20 MG/1
40 TABLET, DELAYED RELEASE ORAL DAILY
Refills: 0 | Status: DISCONTINUED | OUTPATIENT
Start: 2019-09-03 | End: 2019-09-04

## 2019-09-03 RX ORDER — ACETAMINOPHEN 500 MG
1000 TABLET ORAL ONCE
Refills: 0 | Status: COMPLETED | OUTPATIENT
Start: 2019-09-03 | End: 2019-09-03

## 2019-09-03 RX ORDER — MAGNESIUM SULFATE 500 MG/ML
2 VIAL (ML) INJECTION ONCE
Refills: 0 | Status: COMPLETED | OUTPATIENT
Start: 2019-09-03 | End: 2019-09-03

## 2019-09-03 RX ORDER — CALCIUM GLUCONATE 100 MG/ML
1 VIAL (ML) INTRAVENOUS ONCE
Refills: 0 | Status: COMPLETED | OUTPATIENT
Start: 2019-09-03 | End: 2019-09-03

## 2019-09-03 RX ORDER — MEPERIDINE HYDROCHLORIDE 50 MG/ML
25 INJECTION INTRAMUSCULAR; INTRAVENOUS; SUBCUTANEOUS ONCE
Refills: 0 | Status: DISCONTINUED | OUTPATIENT
Start: 2019-09-03 | End: 2019-09-04

## 2019-09-03 RX ORDER — INSULIN HUMAN 100 [IU]/ML
3 INJECTION, SOLUTION SUBCUTANEOUS
Qty: 100 | Refills: 0 | Status: DISCONTINUED | OUTPATIENT
Start: 2019-09-03 | End: 2019-09-04

## 2019-09-03 RX ORDER — DEXTROSE 50 % IN WATER 50 %
25 SYRINGE (ML) INTRAVENOUS
Refills: 0 | Status: DISCONTINUED | OUTPATIENT
Start: 2019-09-03 | End: 2019-09-08

## 2019-09-03 RX ORDER — CHLORHEXIDINE GLUCONATE 213 G/1000ML
15 SOLUTION TOPICAL EVERY 12 HOURS
Refills: 0 | Status: DISCONTINUED | OUTPATIENT
Start: 2019-09-03 | End: 2019-09-04

## 2019-09-03 RX ORDER — DOBUTAMINE HCL 250MG/20ML
1.25 VIAL (ML) INTRAVENOUS
Qty: 500 | Refills: 0 | Status: DISCONTINUED | OUTPATIENT
Start: 2019-09-03 | End: 2019-09-04

## 2019-09-03 RX ORDER — CHLORHEXIDINE GLUCONATE 213 G/1000ML
5 SOLUTION TOPICAL EVERY 4 HOURS
Refills: 0 | Status: DISCONTINUED | OUTPATIENT
Start: 2019-09-03 | End: 2019-09-04

## 2019-09-03 RX ORDER — METOCLOPRAMIDE HCL 10 MG
10 TABLET ORAL EVERY 8 HOURS
Refills: 0 | Status: COMPLETED | OUTPATIENT
Start: 2019-09-03 | End: 2019-09-05

## 2019-09-03 RX ORDER — LIDOCAINE HCL 20 MG/ML
0.2 VIAL (ML) INJECTION ONCE
Refills: 0 | Status: DISCONTINUED | OUTPATIENT
Start: 2019-09-03 | End: 2019-09-03

## 2019-09-03 RX ORDER — DEXMEDETOMIDINE HYDROCHLORIDE IN 0.9% SODIUM CHLORIDE 4 UG/ML
0.5 INJECTION INTRAVENOUS
Qty: 200 | Refills: 0 | Status: DISCONTINUED | OUTPATIENT
Start: 2019-09-03 | End: 2019-09-04

## 2019-09-03 RX ORDER — ASPIRIN/CALCIUM CARB/MAGNESIUM 324 MG
300 TABLET ORAL ONCE
Refills: 0 | Status: DISCONTINUED | OUTPATIENT
Start: 2019-09-03 | End: 2019-09-04

## 2019-09-03 RX ORDER — ASPIRIN/CALCIUM CARB/MAGNESIUM 324 MG
325 TABLET ORAL DAILY
Refills: 0 | Status: DISCONTINUED | OUTPATIENT
Start: 2019-09-04 | End: 2019-09-08

## 2019-09-03 RX ORDER — DOCUSATE SODIUM 100 MG
100 CAPSULE ORAL THREE TIMES A DAY
Refills: 0 | Status: DISCONTINUED | OUTPATIENT
Start: 2019-09-03 | End: 2019-09-08

## 2019-09-03 RX ORDER — CHLORHEXIDINE GLUCONATE 213 G/1000ML
1 SOLUTION TOPICAL
Refills: 0 | Status: DISCONTINUED | OUTPATIENT
Start: 2019-09-03 | End: 2019-09-08

## 2019-09-03 RX ORDER — CEFUROXIME AXETIL 250 MG
1500 TABLET ORAL EVERY 8 HOURS
Refills: 0 | Status: COMPLETED | OUTPATIENT
Start: 2019-09-03 | End: 2019-09-04

## 2019-09-03 RX ORDER — METOCLOPRAMIDE HCL 10 MG
10 TABLET ORAL EVERY 8 HOURS
Refills: 0 | Status: DISCONTINUED | OUTPATIENT
Start: 2019-09-03 | End: 2019-09-03

## 2019-09-03 RX ORDER — VASOPRESSIN 20 [USP'U]/ML
0.03 INJECTION INTRAVENOUS
Qty: 50 | Refills: 0 | Status: DISCONTINUED | OUTPATIENT
Start: 2019-09-03 | End: 2019-09-04

## 2019-09-03 RX ORDER — POTASSIUM CHLORIDE 20 MEQ
10 PACKET (EA) ORAL
Refills: 0 | Status: DISCONTINUED | OUTPATIENT
Start: 2019-09-03 | End: 2019-09-04

## 2019-09-03 RX ORDER — CEFUROXIME AXETIL 250 MG
1500 TABLET ORAL ONCE
Refills: 0 | Status: COMPLETED | OUTPATIENT
Start: 2019-09-03 | End: 2019-09-03

## 2019-09-03 RX ADMIN — Medication 100 MILLIEQUIVALENT(S): at 19:47

## 2019-09-03 RX ADMIN — PANTOPRAZOLE SODIUM 40 MILLIGRAM(S): 20 TABLET, DELAYED RELEASE ORAL at 18:45

## 2019-09-03 RX ADMIN — Medication 6.08 MICROGRAM(S)/KG/MIN: at 21:00

## 2019-09-03 RX ADMIN — HYDROMORPHONE HYDROCHLORIDE 0.5 MILLIGRAM(S): 2 INJECTION INTRAMUSCULAR; INTRAVENOUS; SUBCUTANEOUS at 20:45

## 2019-09-03 RX ADMIN — Medication 400 MILLIGRAM(S): at 17:13

## 2019-09-03 RX ADMIN — CHLORHEXIDINE GLUCONATE 15 MILLILITER(S): 213 SOLUTION TOPICAL at 18:45

## 2019-09-03 RX ADMIN — Medication 100 MILLIEQUIVALENT(S): at 17:14

## 2019-09-03 RX ADMIN — Medication 100 MILLIEQUIVALENT(S): at 22:26

## 2019-09-03 RX ADMIN — Medication 50 GRAM(S): at 20:52

## 2019-09-03 RX ADMIN — NICARDIPINE HYDROCHLORIDE 25 MG/HR: 30 CAPSULE, EXTENDED RELEASE ORAL at 17:14

## 2019-09-03 RX ADMIN — PROPOFOL 12.16 MICROGRAM(S)/KG/MIN: 10 INJECTION, EMULSION INTRAVENOUS at 22:00

## 2019-09-03 RX ADMIN — Medication 200 GRAM(S): at 23:16

## 2019-09-03 RX ADMIN — Medication 1000 MILLIGRAM(S): at 17:28

## 2019-09-03 RX ADMIN — INSULIN HUMAN 3 UNIT(S)/HR: 100 INJECTION, SOLUTION SUBCUTANEOUS at 17:14

## 2019-09-03 RX ADMIN — Medication 10 MILLIGRAM(S): at 23:17

## 2019-09-03 RX ADMIN — HYDROMORPHONE HYDROCHLORIDE 0.5 MILLIGRAM(S): 2 INJECTION INTRAMUSCULAR; INTRAVENOUS; SUBCUTANEOUS at 20:00

## 2019-09-03 RX ADMIN — INSULIN HUMAN 3 UNIT(S)/HR: 100 INJECTION, SOLUTION SUBCUTANEOUS at 19:00

## 2019-09-03 RX ADMIN — HYDROMORPHONE HYDROCHLORIDE 0.5 MILLIGRAM(S): 2 INJECTION INTRAMUSCULAR; INTRAVENOUS; SUBCUTANEOUS at 20:15

## 2019-09-03 RX ADMIN — Medication 100 MILLIEQUIVALENT(S): at 18:19

## 2019-09-03 RX ADMIN — HYDROMORPHONE HYDROCHLORIDE 0.5 MILLIGRAM(S): 2 INJECTION INTRAMUSCULAR; INTRAVENOUS; SUBCUTANEOUS at 20:30

## 2019-09-03 RX ADMIN — Medication 100 MILLIEQUIVALENT(S): at 23:16

## 2019-09-03 RX ADMIN — Medication 100 MILLIEQUIVALENT(S): at 20:14

## 2019-09-03 RX ADMIN — Medication 125 MILLILITER(S): at 22:30

## 2019-09-03 RX ADMIN — Medication 100 MILLIGRAM(S): at 22:28

## 2019-09-03 RX ADMIN — Medication 125 MILLILITER(S): at 18:51

## 2019-09-03 RX ADMIN — Medication 500 MILLILITER(S): at 22:00

## 2019-09-03 RX ADMIN — VASOPRESSIN 2 UNIT(S)/MIN: 20 INJECTION INTRAVENOUS at 21:00

## 2019-09-03 RX ADMIN — Medication 125 MILLILITER(S): at 18:45

## 2019-09-03 RX ADMIN — Medication 125 MILLILITER(S): at 22:15

## 2019-09-03 RX ADMIN — CHLORHEXIDINE GLUCONATE 5 MILLILITER(S): 213 SOLUTION TOPICAL at 22:28

## 2019-09-03 RX ADMIN — Medication 100 MILLIGRAM(S): at 17:13

## 2019-09-03 NOTE — PROGRESS NOTE ADULT - SUBJECTIVE AND OBJECTIVE BOX
9pm-9:30pm    Remained critically ill on continuous ICU monitoring.      OBJECTIVE:  ICU Vital Signs Last 24 Hrs  T(C): 37 (03 Sep 2019 21:00), Max: 37 (03 Sep 2019 21:00)  T(F): 98.6 (03 Sep 2019 21:00), Max: 98.6 (03 Sep 2019 21:00)  HR: 95 (03 Sep 2019 21:15) (65 - 109)  BP: 110/73 (03 Sep 2019 08:25) (110/73 - 110/73)  BP(mean): --  ABP: 115/46 (03 Sep 2019 21:15) (75/51 - 158/71)  ABP(mean): 63 (03 Sep 2019 21:15) (61 - 105)  RR: 14 (03 Sep 2019 21:15) (14 - 26)  SpO2: 100% (03 Sep 2019 21:15) (89% - 100%)    Mode: AC/ CMV (Assist Control/ Continuous Mandatory Ventilation), RR (machine): 14, TV (machine): 500, FiO2: 50, PEEP: 5, ITime: 1, MAP: 9, PIP: 29    09-03 @ 07:01  -  09-03 @ 21:49  --------------------------------------------------------  IN: 1548 mL / OUT: 1290 mL / NET: 258 mL      CAPILLARY BLOOD GLUCOSE      POCT Blood Glucose.: 111 mg/dL (03 Sep 2019 21:10)      PHYSICAL EXAM:Daily Height in cm: 165.1 (03 Sep 2019 08:25)      General: intubated, on ventilator  support with multiple lines & gtt   Neurology: Sedated , nonfocal, PAREDES x 4  Eyes: PERRLA/ EOMI, Gross vision intact  ENT/Neck: Neck supple, ET  trachea midline, No JVD, Gross hearing intact  Respiratory:  B/L fine rales +sternal dressing M & L pleural chest tube   CV: RRR, S1S2, no murmurs, rubs or gallops  Abdominal: Soft, NT, ND +BS,   Extremities: No edema, + peripheral pulses  Skin: No Rashes, Hematoma, Ecchymosis          HOSPITAL MEDICATIONS:  MEDICATIONS  (STANDING):  albumin human  5% IVPB 250 milliLiter(s) IV Intermittent once  aspirin Suppository 300 milliGRAM(s) Rectal once  cefuroxime  IVPB 1500 milliGRAM(s) IV Intermittent once  cefuroxime  IVPB 1500 milliGRAM(s) IV Intermittent every 8 hours  chlorhexidine 0.12% Liquid 15 milliLiter(s) Oral Mucosa every 12 hours  chlorhexidine 0.12% Liquid 5 milliLiter(s) Oral Mucosa every 4 hours  chlorhexidine 2% Cloths 1 Application(s) Topical <User Schedule>  dexmedetomidine Infusion 0.5 MICROgram(s)/kG/Hr (10.137 mL/Hr) IV Continuous <Continuous>  dextrose 50% Injectable 50 milliLiter(s) IV Push every 15 minutes  dextrose 50% Injectable 25 milliLiter(s) IV Push every 15 minutes  DOBUTamine Infusion 2.5 MICROgram(s)/kG/Min (6.082 mL/Hr) IV Continuous <Continuous>  docusate sodium 100 milliGRAM(s) Oral three times a day  insulin regular Infusion 3 Unit(s)/Hr (3 mL/Hr) IV Continuous <Continuous>  meperidine     Injectable 25 milliGRAM(s) IV Push once  metoclopramide Injectable 10 milliGRAM(s) IV Push every 8 hours  pantoprazole  Injectable 40 milliGRAM(s) IV Push daily  potassium chloride  10 mEq/50 mL IVPB 10 milliEquivalent(s) IV Intermittent every 1 hour  potassium chloride  10 mEq/50 mL IVPB 10 milliEquivalent(s) IV Intermittent every 1 hour  potassium chloride  10 mEq/50 mL IVPB 10 milliEquivalent(s) IV Intermittent every 1 hour  propofol Infusion 25 MICROgram(s)/kG/Min (12.165 mL/Hr) IV Continuous <Continuous>  sodium chloride 0.9%. 1000 milliLiter(s) (10 mL/Hr) IV Continuous <Continuous>  vasopressin Infusion 0.033 Unit(s)/Min (2 mL/Hr) IV Continuous <Continuous>    MEDICATIONS  (PRN):      LABS:                        9.1    16.1  )-----------( 182      ( 03 Sep 2019 16:37 )             28.7     09-03    143  |  103  |  16  ----------------------------<  128<H>  3.8   |  21<L>  |  1.00    Ca    7.6<L>      03 Sep 2019 16:37  Phos  2.3     09-03  Mg     1.8     09-03    TPro  4.4<L>  /  Alb  3.2<L>  /  TBili  0.8  /  DBili  x   /  AST  62<H>  /  ALT  17  /  AlkPhos  34<L>  09-03    PT/INR - ( 03 Sep 2019 16:37 )   PT: 16.5 sec;   INR: 1.43 ratio         PTT - ( 03 Sep 2019 16:37 )  PTT:30.0 sec    Arterial Blood Gas:  09-03 @ 20:06  7.37/40/195/23/99/-1.9  ABG lactate: --  Arterial Blood Gas:  09-03 @ 18:50  7.37/39/314/22/99/-2.3  ABG lactate: --  Arterial Blood Gas:  09-03 @ 18:07  7.35/44/280/24/99/-1.6  ABG lactate: --  Arterial Blood Gas:  09-03 @ 16:32  7.42/39/406/25/99/1.4  ABG lactate: --  Arterial Blood Gas:  09-03 @ 14:33  7.37/41/485/23/99/-1.5  ABG lactate: --  Arterial Blood Gas:  09-03 @ 13:57  7.32/48/446/24/99/-1.5  ABG lactate: --  Arterial Blood Gas:  09-03 @ 13:30  7.35/44/368/24/99/-.9  ABG lactate: --  Arterial Blood Gas:  09-03 @ 13:06  7.40/40/364/24/99/-.1  ABG lactate: --  Arterial Blood Gas:  09-03 @ 12:29  7.39/40/396/24/99/-.9  ABG lactate: --  Arterial Blood Gas:  09-03 @ 12:00  7.40/42/423/25/99/.9  ABG lactate: --  Arterial Blood Gas:  09-03 @ 09:23  7.40/37/391/23/99/-1.3  ABG lactate: --    Venous Blood Gas:  09-03 @ 13:58  7.31/52/51/25/80  VBG Lactate: 4.5  Venous Blood Gas:  09-03 @ 13:31  7.34/47/52/25/83  VBG Lactate: 5.0  Venous Blood Gas:  09-03 @ 13:06  7.37/44/52/25/83  VBG Lactate: 4.1  Venous Blood Gas:  09-03 @ 12:30  7.37/44/57/25/87  VBG Lactate: 3.5  Venous Blood Gas:  09-03 @ 12:01  7.38/45/58/26/88  VBG Lactate: 3.6  Venous Blood Gas:  09-03 @ 11:38  7.40/44/58/27/89  VBG Lactate: 2.4    CARDIAC MARKERS ( 03 Sep 2019 16:37 )  x     / x     / 985 U/L / x     / 38.4 ng/mL    LIVER FUNCTIONS - ( 03 Sep 2019 16:37 )  Alb: 3.2 g/dL / Pro: 4.4 g/dL / ALK PHOS: 34 U/L / ALT: 17 U/L / AST: 62 U/L / GGT: x           MICROBIOLOGY:     RADIOLOGY:  X Reviewed and interpreted by me

## 2019-09-03 NOTE — PRE-ANESTHESIA EVALUATION ADULT - NSANTHOSAYNRD_GEN_A_CORE
criteria/No. BLANCA screening performed.  STOP BANG Legend: 0-2 = LOW Risk; 3-4 = INTERMEDIATE Risk; 5-8 = HIGH Risk

## 2019-09-03 NOTE — PATIENT PROFILE ADULT - NSPROGENPREVTRANSF_GEN_A_NUR
Consent 1/Introductory Paragraph: The rationale for Mohs was explained to the patient and consent was obtained. The risks, benefits and alternatives to therapy were discussed in detail. Specifically, the risks of infection, scarring, bleeding, prolonged wound healing, incomplete removal, allergy to anesthesia, nerve injury and recurrence were addressed. Prior to the procedure, the treatment site was clearly identified and confirmed by the patient. All components of Universal Protocol/PAUSE Rule completed. Quadrants Reporting?: 0 Inflammation Suggestive Of Cancer Camouflage Histology Text: There was a dense lymphocytic infiltrate which prevented adequate histologic evaluation of adjacent structures. Primary Defect Width In Cm (Final Defect Size - Required For Flaps/Grafts): 2.5 Repair Hemostasis (Optional): Electrodesiccation Referred To Plastics For Closure Text (Leave Blank If You Do Not Want): After obtaining clear surgical margins the patient was sent to plastics for surgical repair.  The patient understands they will receive post-surgical care and follow-up from the referring physician's office. Postop Diagnosis: same Surgeon: Frank Mcclellan MD Full Thickness Lip Wedge Repair (Flap) Text: Given the location of the defect and the proximity to free margins a full thickness wedge repair was deemed most appropriate.  Using a sterile surgical marker, the appropriate repair was drawn incorporating the defect and placing the expected incisions perpendicular to the vermilion border.  The vermilion border was also meticulously outlined to ensure appropriate reapproximation during the repair.  The area thus outlined was incised through and through with a #15 scalpel blade.  The muscularis and dermis were reaproximated with deep sutures following hemostasis. Care was taken to realign the vermilion border before proceeding with the superficial closure.  Once the vermilion was realigned the superfical and mucosal closure was finished. Consent (Temporal Branch)/Introductory Paragraph: The rationale for Mohs was explained to the patient and consent was obtained. The risks, benefits and alternatives to therapy were discussed in detail. Specifically, the risks of damage to the temporal branch of the facial nerve, infection, scarring, bleeding, prolonged wound healing, incomplete removal, allergy to anesthesia, and recurrence were addressed. Prior to the procedure, the treatment site was clearly identified and confirmed by the patient. All components of Universal Protocol/PAUSE Rule completed. Mucosal Advancement Flap Text: Given the location of the defect, shape of the defect and the proximity to free margins a mucosal advancement flap was deemed most appropriate. Incisions were made with a 15 blade scalpel in the appropriate fashion along the cutaneous vermilion border and the mucosal lip. The remaining actinically damaged mucosal tissue was excised.  The mucosal advancement flap was then elevated to the gingival sulcus with care taken to preserve the neurovascular structures and advanced into the primary defect. Care was taken to ensure that precise realignment of the vermilion border was achieved. Referred To Mid-Level For Closure Text (Leave Blank If You Do Not Want): After obtaining clear surgical margins the patient was sent to a mid-level provider for surgical repair.  The patient understands they will receive post-surgical care and follow-up from the mid-level provider. Unna Boot Text: An Unna boot was placed to help immobilize the limb and facilitate more rapid healing. Melolabial Interpolation Flap Text: A decision was made to reconstruct the defect utilizing an interpolation axial flap and a staged reconstruction.  A telfa template was made of the defect.  This telfa template was then used to outline the melolabial interpolation flap.  The donor area for the pedicle flap was then injected with anesthesia.  The flap was excised through the skin and subcutaneous tissue down to the layer of the underlying musculature.  The pedicle flap was carefully excised within this deep plane to maintain its blood supply.  The edges of the donor site were undermined.   The donor site was closed in a primary fashion.  The pedicle was then rotated into position and sutured.  Once the tube was sutured into place, adequate blood supply was confirmed with blanching and refill.  The pedicle was then wrapped with xeroform gauze and dressed appropriately with a telfa and gauze bandage to ensure continued blood supply and protect the attached pedicle. Cartilage Graft Text: The defect edges were debeveled with a #15 scalpel blade.  Given the location of the defect, shape of the defect, the fact the defect involved a full thickness cartilage defect a cartilage graft was deemed most appropriate.  An appropriate donor site was identified, cleansed, and anesthetized. The cartilage graft was then harvested and transferred to the recipient site, oriented appropriately and then sutured into place.  The secondary defect was then repaired using a primary closure. Mauc Instructions: By selecting yes to the question below the MAUC number will be added into the note.  This will be calculated automatically based on the diagnosis chosen, the size entered, the body zone selected (H,M,L) and the specific indications you chose. You will also have the option to override the Mohs AUC if you disagree with the automatically calculated number and this option is found in the Case Summary tab. Quadrant Reporting?: no A-T Advancement Flap Text: The defect edges were debeveled with a #15 scalpel blade.  Given the location of the defect, shape of the defect and the proximity to free margins an A-T advancement flap was deemed most appropriate.  Using a sterile surgical marker, an appropriate advancement flap was drawn incorporating the defect and placing the expected incisions within the relaxed skin tension lines where possible.    The area thus outlined was incised deep to adipose tissue with a #15 scalpel blade.  The skin margins were undermined to an appropriate distance in all directions utilizing iris scissors. Eye Protection Verbiage: Before proceeding with the stage, a plastic scleral shield was inserted. The globe was anesthetized with a few drops of 1% lidocaine with 1:100,000 epinephrine. Then, an appropriate sized scleral shield was chosen and coated with lacrilube ointment. The shield was gently inserted and left in place for the duration of each stage. After the stage was completed, the shield was gently removed. Stage 10: Additional Anesthesia Type: 1% lidocaine with epinephrine Additional Anesthesia Volume In Cc: 6 Mastoid Interpolation Flap Text: A decision was made to reconstruct the defect utilizing an interpolation axial flap and a staged reconstruction.  A telfa template was made of the defect.  This telfa template was then used to outline the mastoid interpolation flap.  The donor area for the pedicle flap was then injected with anesthesia.  The flap was excised through the skin and subcutaneous tissue down to the layer of the underlying musculature.  The pedicle flap was carefully excised within this deep plane to maintain its blood supply.  The edges of the donor site were undermined.   The donor site was closed in a primary fashion.  The pedicle was then rotated into position and sutured.  Once the tube was sutured into place, adequate blood supply was confirmed with blanching and refill.  The pedicle was then wrapped with xeroform gauze and dressed appropriately with a telfa and gauze bandage to ensure continued blood supply and protect the attached pedicle. Initial Size Of Lesion: 2.2 No Residual Tumor Seen Histology Text: There were no malignant cells seen in the sections examined. Lazy S Intermediate Repair Preamble Text (Leave Blank If You Do Not Want): Undermining was performed with blunt dissection. Bilateral Helical Rim Advancement Flap Text: The defect edges were debeveled with a #15 blade scalpel.  Given the location of the defect and the proximity to free margins (helical rim) a bilateral helical rim advancement flap was deemed most appropriate.  Using a sterile surgical marker, the appropriate advancement flaps were drawn incorporating the defect and placing the expected incisions between the helical rim and antihelix where possible.  The area thus outlined was incised through and through with a #15 scalpel blade.  With a skin hook and iris scissors, the flaps were gently and sharply undermined and freed up. Partial Purse String (Intermediate) Text: Given the location of the defect and the characteristics of the surrounding skin an intermediate purse string closure was deemed most appropriate.  Undermining was performed circumfirentially around the surgical defect.  A purse string suture was then placed and tightened. Wound tension only allowed a partial closure of the circular defect. Complex Repair And Flap Additional Text (Will Appearing After The Standard Complex Repair Text): The complex repair was not sufficient to completely close the primary defect. The remaining additional defect was repaired with the flap mentioned below. Consent (Spinal Accessory)/Introductory Paragraph: The rationale for Mohs was explained to the patient and consent was obtained. The risks, benefits and alternatives to therapy were discussed in detail. Specifically, the risks of damage to the spinal accessory nerve, infection, scarring, bleeding, prolonged wound healing, incomplete removal, allergy to anesthesia, and recurrence were addressed. Prior to the procedure, the treatment site was clearly identified and confirmed by the patient. All components of Universal Protocol/PAUSE Rule completed. Referred To Asc For Closure Text (Leave Blank If You Do Not Want): After obtaining clear surgical margins the patient was sent to an ASC for surgical repair.  The patient understands they will receive post-surgical care and follow-up from the ASC physician. Consent 3/Introductory Paragraph: I gave the patient a chance to ask questions they had about the procedure.  Following this I explained the Mohs procedure and consent was obtained. The risks, benefits and alternatives to therapy were discussed in detail. Specifically, the risks of infection, scarring, bleeding, prolonged wound healing, incomplete removal, allergy to anesthesia, nerve injury and recurrence were addressed. Prior to the procedure, the treatment site was clearly identified and confirmed by the patient. All components of Universal Protocol/PAUSE Rule completed. no Epidermal Closure Graft Donor Site (Optional): running Ftsg Text: The defect edges were debeveled with a #15 scalpel blade.  Given the location of the defect, shape of the defect and the proximity to free margins a full thickness skin graft was deemed most appropriate.  Using a sterile surgical marker, the primary defect shape was transferred to the donor site. The area thus outlined was incised deep to adipose tissue with a #15 scalpel blade.  The harvested graft was then trimmed of adipose tissue until only dermis and epidermis was left.  The skin margins of the secondary defect were undermined to an appropriate distance in all directions utilizing iris scissors.  The secondary defect was closed with interrupted buried subcutaneous sutures.  The skin edges were then re-apposed with running  sutures.  The skin graft was then placed in the primary defect and oriented appropriately. Dorsal Nasal Flap Text: The defect edges were debeveled with a #15 scalpel blade.  Given the location of the defect and the proximity to free margins a dorsal nasal flap was deemed most appropriate.  Using a sterile surgical marker, an appropriate dorsal nasal flap was drawn around the defect.    The area thus outlined was incised deep to adipose tissue with a #15 scalpel blade.  The skin margins were undermined to an appropriate distance in all directions utilizing iris scissors. Advancement-Rotation Flap Text: The defect edges were debeveled with a #15 scalpel blade.  Given the location of the defect, shape of the defect and the proximity to free margins an advancement-rotation flap was deemed most appropriate.  Using a sterile surgical marker, an appropriate flap was drawn incorporating the defect and placing the expected incisions within the relaxed skin tension lines where possible. The area thus outlined was incised deep to adipose tissue with a #15 scalpel blade.  The skin margins were undermined to an appropriate distance in all directions utilizing iris scissors. Closure 2 Information: This tab is for additional flaps and grafts, including complex repair and grafts and complex repair and flaps. You can also specify a different location for the additional defect, if the location is the same you do not need to select a new one. We will insert the automated text for the repair you select below just as we do for solitary flaps and grafts. Please note that at this time if you select a location with a different insurance zone you will need to override the ICD10 and CPT if appropriate. Bcc Histology Text: There were numerous aggregates of basaloid cells. Melolabial Transposition Flap Text: The defect edges were debeveled with a #15 scalpel blade.  Given the location of the defect and the proximity to free margins a melolabial flap was deemed most appropriate.  Using a sterile surgical marker, an appropriate melolabial transposition flap was drawn incorporating the defect.    The area thus outlined was incised deep to adipose tissue with a #15 scalpel blade.  The skin margins were undermined to an appropriate distance in all directions utilizing iris scissors. Bilobed Transposition Flap Text: The defect edges were debeveled with a #15 scalpel blade.  Given the location of the defect and the proximity to free margins a bilobed transposition flap was deemed most appropriate.  Using a sterile surgical marker, an appropriate bilobe flap drawn around the defect.    The area thus outlined was incised deep to adipose tissue with a #15 scalpel blade.  The skin margins were undermined to an appropriate distance in all directions utilizing iris scissors. Surgeon/Pathologist Verbiage (Will Incorporate Name Of Surgeon From Intro If Not Blank): operated in two distinct and integrated capacities as the surgeon and pathologist. Anesthesia Volume In Cc: 4 Alternatives Discussed Intro (Do Not Add Period): I discussed alternative treatments to Mohs surgery and specifically discussed the risks and benefits of Closure 3 Information: This tab is for additional flaps and grafts above and beyond our usual structured repairs.  Please note if you enter information here it will not currently bill and you will need to add the billing information manually. Ear Wedge Repair Text: A wedge excision was completed by carrying down an excision through the full thickness of the ear and cartilage with an inward facing Burow's triangle. The wound was then closed in a layered fashion. Suture Removal: 14 days W Plasty Text: The lesion was extirpated to the level of the fat with a #15 scalpel blade.  Given the location of the defect, shape of the defect and the proximity to free margins a W-plasty was deemed most appropriate for repair.  Using a sterile surgical marker, the appropriate transposition arms of the W-plasty were drawn incorporating the defect and placing the expected incisions within the relaxed skin tension lines where possible.    The area thus outlined was incised deep to adipose tissue with a #15 scalpel blade.  The skin margins were undermined to an appropriate distance in all directions utilizing iris scissors.  The opposing transposition arms were then transposed into place in opposite direction and anchored with interrupted buried subcutaneous sutures. Trilobed Flap Text: The defect edges were debeveled with a #15 scalpel blade.  Given the location of the defect and the proximity to free margins a trilobed flap was deemed most appropriate.  Using a sterile surgical marker, an appropriate trilobed flap drawn around the defect.    The area thus outlined was incised deep to adipose tissue with a #15 scalpel blade.  The skin margins were undermined to an appropriate distance in all directions utilizing iris scissors. O-T Advancement Flap Text: The defect edges were debeveled with a #15 scalpel blade.  Given the location of the defect, shape of the defect and the proximity to free margins an O-T advancement flap was deemed most appropriate.  Using a sterile surgical marker, an appropriate advancement flap was drawn incorporating the defect and placing the expected incisions within the relaxed skin tension lines where possible.    The area thus outlined was incised deep to adipose tissue with a #15 scalpel blade.  The skin margins were undermined to an appropriate distance in all directions utilizing iris scissors. Anesthesia Volume In Cc: 3 Tissue Cultured Epidermal Autograft Text: The defect edges were debeveled with a #15 scalpel blade.  Given the location of the defect, shape of the defect and the proximity to free margins a tissue cultured epidermal autograft was deemed most appropriate.  The graft was then trimmed to fit the size of the defect.  The graft was then placed in the primary defect and oriented appropriately. Graft Donor Site Dermal Sutures (Optional): 4-0 Vicryl Crescentic Complex Repair Preamble Text (Leave Blank If You Do Not Want): Extensive wide undermining was performed. Complex Repair And Graft Additional Text (Will Appearing After The Standard Complex Repair Text): The complex repair was not sufficient to completely close the primary defect. The remaining additional defect was repaired with the graft mentioned below. O-T Plasty Text: The defect edges were debeveled with a #15 scalpel blade.  Given the location of the defect, shape of the defect and the proximity to free margins an O-T plasty was deemed most appropriate.  Using a sterile surgical marker, an appropriate O-T plasty was drawn incorporating the defect and placing the expected incisions within the relaxed skin tension lines where possible.    The area thus outlined was incised deep to adipose tissue with a #15 scalpel blade.  The skin margins were undermined to an appropriate distance in all directions utilizing iris scissors. Helical Rim Advancement Flap Text: The defect edges were debeveled with a #15 blade scalpel.  Given the location of the defect and the proximity to free margins (helical rim) a double helical rim advancement flap was deemed most appropriate.  Using a sterile surgical marker, the appropriate advancement flaps were drawn incorporating the defect and placing the expected incisions between the helical rim and antihelix where possible.  The area thus outlined was incised through and through with a #15 scalpel blade.  With a skin hook and iris scissors, the flaps were gently and sharply undermined and freed up. Anesthesia Type: 1% lidocaine, 0.5% Marcaine, 1:100,000 epinephrine and 8.4% sodium bicarbonate Posterior Auricular Interpolation Flap Text: A decision was made to reconstruct the defect utilizing an interpolation axial flap and a staged reconstruction.  A telfa template was made of the defect.  This telfa template was then used to outline the posterior auricular interpolation flap.  The donor area for the pedicle flap was then injected with anesthesia.  The flap was excised through the skin and subcutaneous tissue down to the layer of the underlying musculature.  The pedicle flap was carefully excised within this deep plane to maintain its blood supply.  The edges of the donor site were undermined.   The donor site was closed in a primary fashion.  The pedicle was then rotated into position and sutured.  Once the tube was sutured into place, adequate blood supply was confirmed with blanching and refill.  The pedicle was then wrapped with xeroform gauze and dressed appropriately with a telfa and gauze bandage to ensure continued blood supply and protect the attached pedicle. Bilobed Flap Text: The defect edges were debeveled with a #15 scalpel blade.  Given the location of the defect and the proximity to free margins a bilobe flap was deemed most appropriate.  Using a sterile surgical marker, an appropriate bilobe flap drawn around the defect.    The area thus outlined was incised deep to adipose tissue with a #15 scalpel blade.  The skin margins were undermined to an appropriate distance in all directions utilizing iris scissors. Estimated Blood Loss (Cc): minimal No Repair - Repaired With Adjacent Surgical Defect Text (Leave Blank If You Do Not Want): After obtaining clear surgical margins the defect was repaired concurrently with another surgical defect which was in close approximation. Skin Substitute Text: The defect edges were debeveled with a #15 scalpel blade.  Given the location of the defect, shape of the defect and the proximity to free margins a skin substitute graft was deemed most appropriate.  The graft material was trimmed to fit the size of the defect. The graft was then placed in the primary defect and oriented appropriately. Consent 2/Introductory Paragraph: Mohs surgery was explained to the patient and consent was obtained. The risks, benefits and alternatives to therapy were discussed in detail. Specifically, the risks of infection, scarring, bleeding, prolonged wound healing, incomplete removal, allergy to anesthesia, nerve injury and recurrence were addressed. Prior to the procedure, the treatment site was clearly identified and confirmed by the patient. All components of Universal Protocol/PAUSE Rule completed. Split-Thickness Skin Graft Text: The defect edges were debeveled with a #15 scalpel blade.  Given the location of the defect, shape of the defect and the proximity to free margins a split thickness skin graft was deemed most appropriate.  Using a sterile surgical marker, the primary defect shape was transferred to the donor site. The split thickness graft was then harvested.  The skin graft was then placed in the primary defect and oriented appropriately. Interpolation Flap Text: A decision was made to reconstruct the defect utilizing an interpolation axial flap and a staged reconstruction.  A telfa template was made of the defect.  This telfa template was then used to outline the interpolation flap.  The donor area for the pedicle flap was then injected with anesthesia.  The flap was excised through the skin and subcutaneous tissue down to the layer of the underlying musculature.  The interpolation flap was carefully excised within this deep plane to maintain its blood supply.  The edges of the donor site were undermined.   The donor site was closed in a primary fashion.  The pedicle was then rotated into position and sutured.  Once the tube was sutured into place, adequate blood supply was confirmed with blanching and refill.  The pedicle was then wrapped with xeroform gauze and dressed appropriately with a telfa and gauze bandage to ensure continued blood supply and protect the attached pedicle. X Size Of Lesion In Cm (Optional): 2 Consent (Scalp)/Introductory Paragraph: The rationale for Mohs was explained to the patient and consent was obtained. The risks, benefits and alternatives to therapy were discussed in detail. Specifically, the risks of changes in hair growth pattern secondary to repair, infection, scarring, bleeding, prolonged wound healing, incomplete removal, allergy to anesthesia, nerve injury and recurrence were addressed. Prior to the procedure, the treatment site was clearly identified and confirmed by the patient. All components of Universal Protocol/PAUSE Rule completed. Graft Donor Site Epidermal Sutures (Optional): 5-0 Ethilon Consent (Lip)/Introductory Paragraph: The rationale for Mohs was explained to the patient and consent was obtained. The risks, benefits and alternatives to therapy were discussed in detail. Specifically, the risks of lip deformity, changes in the oral aperture, infection, scarring, bleeding, prolonged wound healing, incomplete removal, allergy to anesthesia, nerve injury and recurrence were addressed. Prior to the procedure, the treatment site was clearly identified and confirmed by the patient. All components of Universal Protocol/PAUSE Rule completed. Manual Repair Warning Statement: We plan on removing the manually selected variable below in favor of our much easier automatic structured text blocks found in the previous tab. We decided to do this to help make the flow better and give you the full power of structured data. Manual selection is never going to be ideal in our platform and I would encourage you to avoid using manual selection from this point on, especially since I will be sunsetting this feature. It is important that you do one of two things with the customized text below. First, you can save all of the text in a word file so you can have it for future reference. Second, transfer the text to the appropriate area in the Library tab. Lastly, if there is a flap or graft type which we do not have you need to let us know right away so I can add it in before the variable is hidden. No need to panic, we plan to give you roughly 6 months to make the change. O-Z Plasty Text: The defect edges were debeveled with a #15 scalpel blade.  Given the location of the defect, shape of the defect and the proximity to free margins an O-Z plasty (double transposition flap) was deemed most appropriate.  Using a sterile surgical marker, the appropriate transposition flaps were drawn incorporating the defect and placing the expected incisions within the relaxed skin tension lines where possible.    The area thus outlined was incised deep to adipose tissue with a #15 scalpel blade.  The skin margins were undermined to an appropriate distance in all directions utilizing iris scissors.  Hemostasis was achieved with electrocautery.  The flaps were then transposed into place, one clockwise and the other counterclockwise, and anchored with interrupted buried subcutaneous sutures. Tumor Debulked?: curette Display The Frozen Section And Histology As A Separate Paragraph: Yes Xenograft Text: The defect edges were debeveled with a #15 scalpel blade.  Given the location of the defect, shape of the defect and the proximity to free margins a xenograft was deemed most appropriate.  The graft was then trimmed to fit the size of the defect.  The graft was then placed in the primary defect and oriented appropriately. Hatchet Flap Text: The defect edges were debeveled with a #15 scalpel blade.  Given the location of the defect, shape of the defect and the proximity to free margins a hatchet flap was deemed most appropriate.  Using a sterile surgical marker, an appropriate hatchet flap was drawn incorporating the defect and placing the expected incisions within the relaxed skin tension lines where possible.    The area thus outlined was incised deep to adipose tissue with a #15 scalpel blade.  The skin margins were undermined to an appropriate distance in all directions utilizing iris scissors. Post-Care Instructions: I reviewed with the patient in detail post-care instructions. Patient is not to engage in any heavy lifting, exercise, or swimming for the next 7 days. Should the patient develop any fevers, chills, bleeding, severe pain patient will contact the office immediately. Muscle Hinge Flap Text: The defect edges were debeveled with a #15 scalpel blade.  Given the size, depth and location of the defect and the proximity to free margins a muscle hinge flap was deemed most appropriate.  Using a sterile surgical marker, an appropriate hinge flap was drawn incorporating the defect. The area thus outlined was incised with a #15 scalpel blade.  The skin margins were undermined to an appropriate distance in all directions utilizing iris scissors. Simple / Intermediate / Complex Repair - Final Wound Length In Cm: 5.2 ia Id #: [id number] Cheiloplasty (Less Than 50%) Text: A decision was made to reconstruct the defect with a  cheiloplasty.  The defect was undermined extensively.  Additional obicularis oris muscle was excised with a 15 blade scalpel.  The defect was converted into a full thickness wedge, of less than 50% of the vertical height of the lip, to facilite a better cosmetic result.  Small vessels were then tied off with 5-0 monocyrl. The obicularis oris, superficial fascia, adipose and dermis were then reapproximated.  After the deeper layers were approximated the epidermis was reapproximated with particular care given to realign the vermilion border. V-Y Flap Text: The defect edges were debeveled with a #15 scalpel blade.  Given the location of the defect, shape of the defect and the proximity to free margins a V-Y flap was deemed most appropriate.  Using a sterile surgical marker, an appropriate advancement flap was drawn incorporating the defect and placing the expected incisions within the relaxed skin tension lines where possible.    The area thus outlined was incised deep to adipose tissue with a #15 scalpel blade.  The skin margins were undermined to an appropriate distance in all directions utilizing iris scissors. Bi-Rhombic Flap Text: The defect edges were debeveled with a #15 scalpel blade.  Given the location of the defect and the proximity to free margins a bi-rhombic flap was deemed most appropriate.  Using a sterile surgical marker, an appropriate rhombic flap was drawn incorporating the defect. The area thus outlined was incised deep to adipose tissue with a #15 scalpel blade.  The skin margins were undermined to an appropriate distance in all directions utilizing iris scissors. Z Plasty Text: The lesion was extirpated to the level of the fat with a #15 scalpel blade.  Given the location of the defect, shape of the defect and the proximity to free margins a Z-plasty was deemed most appropriate for repair.  Using a sterile surgical marker, the appropriate transposition arms of the Z-plasty were drawn incorporating the defect and placing the expected incisions within the relaxed skin tension lines where possible.    The area thus outlined was incised deep to adipose tissue with a #15 scalpel blade.  The skin margins were undermined to an appropriate distance in all directions utilizing iris scissors.  The opposing transposition arms were then transposed into place in opposite direction and anchored with interrupted buried subcutaneous sutures. Consent Type: Consent 1 (Standard) Dermal Autograft Text: The defect edges were debeveled with a #15 scalpel blade.  Given the location of the defect, shape of the defect and the proximity to free margins a dermal autograft was deemed most appropriate.  Using a sterile surgical marker, the primary defect shape was transferred to the donor site. The area thus outlined was incised deep to adipose tissue with a #15 scalpel blade.  The harvested graft was then trimmed of adipose and epidermal tissue until only dermis was left.  The skin graft was then placed in the primary defect and oriented appropriately. Graft Donor Site Bandage (Optional-Leave Blank If You Don't Want In Note): pressure bandage were applied to the donor site. Rotation Flap Text: The defect edges were debeveled with a #15 scalpel blade.  Given the location of the defect, shape of the defect and the proximity to free margins a rotation flap was deemed most appropriate.  Using a sterile surgical marker, an appropriate rotation flap was drawn incorporating the defect and placing the expected incisions within the relaxed skin tension lines where possible.    The area thus outlined was incised deep to adipose tissue with a #15 scalpel blade.  The skin margins were undermined to an appropriate distance in all directions utilizing iris scissors. Location Indication Override (Is Already Calculated Based On Selected Body Location): Area L Localized Dermabrasion With Wire Brush Text: The patient was draped in routine manner.  Localized dermabrasion using 3 x 17 mm wire brush was performed in routine manner to papillary dermis. This spot dermabrasion is being performed to complete skin cancer reconstruction. It also will eliminate the other sun damaged precancerous cells that are known to be part of the regional effect of a lifetime's worth of sun exposure. This localized dermabrasion is therapeutic and should not be considered cosmetic in any regard. Deep Sutures: 3-0 Vicryl Purse String (Intermediate) Text: Given the location of the defect and the characteristics of the surrounding skin a pursestring intermediate closure was deemed most appropriate.  Undermining was performed circumfirentially around the surgical defect.  A purstring suture was then placed and tightened. Consent (Nose)/Introductory Paragraph: The rationale for Mohs was explained to the patient and consent was obtained. The risks, benefits and alternatives to therapy were discussed in detail. Specifically, the risks of nasal deformity, changes in the flow of air through the nose, infection, scarring, bleeding, prolonged wound healing, incomplete removal, allergy to anesthesia, nerve injury and recurrence were addressed. Prior to the procedure, the treatment site was clearly identified and confirmed by the patient. All components of Universal Protocol/PAUSE Rule completed. Mohs Case Number:  Referred To Otolaryngology For Closure Text (Leave Blank If You Do Not Want): After obtaining clear surgical margins the patient was sent to otolaryngology for surgical repair.  The patient understands they will receive post-surgical care and follow-up from the referring physician's office. Cheek Interpolation Flap Text: A decision was made to reconstruct the defect utilizing an interpolation axial flap and a staged reconstruction.  A telfa template was made of the defect.  This telfa template was then used to outline the Cheek Interpolation flap.  The donor area for the pedicle flap was then injected with anesthesia.  The flap was excised through the skin and subcutaneous tissue down to the layer of the underlying musculature.  The interpolation flap was carefully excised within this deep plane to maintain its blood supply.  The edges of the donor site were undermined.   The donor site was closed in a primary fashion.  The pedicle was then rotated into position and sutured.  Once the tube was sutured into place, adequate blood supply was confirmed with blanching and refill.  The pedicle was then wrapped with xeroform gauze and dressed appropriately with a telfa and gauze bandage to ensure continued blood supply and protect the attached pedicle. Paramedian Forehead Flap Text: A decision was made to reconstruct the defect utilizing an interpolation axial flap and a staged reconstruction.  A telfa template was made of the defect.  This telfa template was then used to outline the paramedian forehead pedicle flap.  The donor area for the pedicle flap was then injected with anesthesia.  The flap was excised through the skin and subcutaneous tissue down to the layer of the underlying musculature.  The pedicle flap was carefully excised within this deep plane to maintain its blood supply.  The edges of the donor site were undermined.   The donor site was closed in a primary fashion.  The pedicle was then rotated into position and sutured.  Once the tube was sutured into place, adequate blood supply was confirmed with blanching and refill.  The pedicle was then wrapped with xeroform gauze and dressed appropriately with a telfa and gauze bandage to ensure continued blood supply and protect the attached pedicle. Wound Care: Petrolatum Advancement Flap (Double) Text: The defect edges were debeveled with a #15 scalpel blade.  Given the location of the defect and the proximity to free margins a double advancement flap was deemed most appropriate.  Using a sterile surgical marker, the appropriate advancement flaps were drawn incorporating the defect and placing the expected incisions within the relaxed skin tension lines where possible.    The area thus outlined was incised deep to adipose tissue with a #15 scalpel blade.  The skin margins were undermined to an appropriate distance in all directions utilizing iris scissors. V-Y Plasty Text: The defect edges were debeveled with a #15 scalpel blade.  Given the location of the defect, shape of the defect and the proximity to free margins an V-Y advancement flap was deemed most appropriate.  Using a sterile surgical marker, an appropriate advancement flap was drawn incorporating the defect and placing the expected incisions within the relaxed skin tension lines where possible.    The area thus outlined was incised deep to adipose tissue with a #15 scalpel blade.  The skin margins were undermined to an appropriate distance in all directions utilizing iris scissors. H Plasty Text: Given the location of the defect, shape of the defect and the proximity to free margins a H-plasty was deemed most appropriate for repair.  Using a sterile surgical marker, the appropriate advancement arms of the H-plasty were drawn incorporating the defect and placing the expected incisions within the relaxed skin tension lines where possible. The area thus outlined was incised deep to adipose tissue with a #15 scalpel blade. The skin margins were undermined to an appropriate distance in all directions utilizing iris scissors.  The opposing advancement arms were then advanced into place in opposite direction and anchored with interrupted buried subcutaneous sutures. Subsequent Stages Histo Method Verbiage: Using a similar technique to that described above, a thin layer of tissue was removed from all areas where tumor was visible on the previous stage.  The tissue was again oriented, mapped, dyed, and processed as above. Consent (Marginal Mandibular)/Introductory Paragraph: The rationale for Mohs was explained to the patient and consent was obtained. The risks, benefits and alternatives to therapy were discussed in detail. Specifically, the risks of damage to the marginal mandibular branch of the facial nerve, infection, scarring, bleeding, prolonged wound healing, incomplete removal, allergy to anesthesia, and recurrence were addressed. Prior to the procedure, the treatment site was clearly identified and confirmed by the patient. All components of Universal Protocol/PAUSE Rule completed. Medical Necessity Statement: Based on my medical judgement, Mohs surgery is the most appropriate treatment for this cancer compared to other treatments. Cheek-To-Nose Interpolation Flap Text: A decision was made to reconstruct the defect utilizing an interpolation axial flap and a staged reconstruction.  A telfa template was made of the defect.  This telfa template was then used to outline the Cheek-To-Nose Interpolation flap.  The donor area for the pedicle flap was then injected with anesthesia.  The flap was excised through the skin and subcutaneous tissue down to the layer of the underlying musculature.  The interpolation flap was carefully excised within this deep plane to maintain its blood supply.  The edges of the donor site were undermined.   The donor site was closed in a primary fashion.  The pedicle was then rotated into position and sutured.  Once the tube was sutured into place, adequate blood supply was confirmed with blanching and refill.  The pedicle was then wrapped with xeroform gauze and dressed appropriately with a telfa and gauze bandage to ensure continued blood supply and protect the attached pedicle. Repair Anesthesia Method: local infiltration Crescentic Advancement Flap Text: The defect edges were debeveled with a #15 scalpel blade.  Given the location of the defect and the proximity to free margins a crescentic advancement flap was deemed most appropriate.  Using a sterile surgical marker, the appropriate advancement flap was drawn incorporating the defect and placing the expected incisions within the relaxed skin tension lines where possible.    The area thus outlined was incised deep to adipose tissue with a #15 scalpel blade.  The skin margins were undermined to an appropriate distance in all directions utilizing iris scissors. Star Wedge Flap Text: The defect edges were debeveled with a #15 scalpel blade.  Given the location of the defect, shape of the defect and the proximity to free margins a star wedge flap was deemed most appropriate.  Using a sterile surgical marker, an appropriate rotation flap was drawn incorporating the defect and placing the expected incisions within the relaxed skin tension lines where possible. The area thus outlined was incised deep to adipose tissue with a #15 scalpel blade.  The skin margins were undermined to an appropriate distance in all directions utilizing iris scissors. Bcc Infiltrative Histology Text: There were numerous aggregates of basaloid cells demonstrating an infiltrative pattern. Ear Star Wedge Flap Text: The defect edges were debeveled with a #15 blade scalpel.  Given the location of the defect and the proximity to free margins (helical rim) an ear star wedge flap was deemed most appropriate.  Using a sterile surgical marker, the appropriate flap was drawn incorporating the defect and placing the expected incisions between the helical rim and antihelix where possible.  The area thus outlined was incised through and through with a #15 scalpel blade. Cheiloplasty (Complex) Text: A decision was made to reconstruct the defect with a  cheiloplasty.  The defect was undermined extensively.  Additional obicularis oris muscle was excised with a 15 blade scalpel.  The defect was converted into a full thickness wedge to facilite a better cosmetic result.  Small vessels were then tied off with 5-0 monocyrl. The obicularis oris, superficial fascia, adipose and dermis were then reapproximated.  After the deeper layers were approximated the epidermis was reapproximated with particular care given to realign the vermilion border. Double Island Pedicle Flap Text: The defect edges were debeveled with a #15 scalpel blade.  Given the location of the defect, shape of the defect and the proximity to free margins a double island pedicle advancement flap was deemed most appropriate.  Using a sterile surgical marker, an appropriate advancement flap was drawn incorporating the defect, outlining the appropriate donor tissue and placing the expected incisions within the relaxed skin tension lines where possible.    The area thus outlined was incised deep to adipose tissue with a #15 scalpel blade.  The skin margins were undermined to an appropriate distance in all directions around the primary defect and laterally outward around the island pedicle utilizing iris scissors.  There was minimal undermining beneath the pedicle flap. Repair Performed By Another Provider Text (Leave Blank If You Do Not Want): After obtaining clear surgical margins the defect was repaired by another provider. Undermining Location (Optional): in the deep fat Alar Island Pedicle Flap Text: The defect edges were debeveled with a #15 scalpel blade.  Given the location of the defect, shape of the defect and the proximity to the alar rim an island pedicle advancement flap was deemed most appropriate.  Using a sterile surgical marker, an appropriate advancement flap was drawn incorporating the defect, outlining the appropriate donor tissue and placing the expected incisions within the nasal ala running parallel to the alar rim. The area thus outlined was incised with a #15 scalpel blade.  The skin margins were undermined minimally to an appropriate distance in all directions around the primary defect and laterally outward around the island pedicle utilizing iris scissors.  There was minimal undermining beneath the pedicle flap. S Plasty Text: Given the location and shape of the defect, and the orientation of relaxed skin tension lines, an S-plasty was deemed most appropriate for repair.  Using a sterile surgical marker, the appropriate outline of the S-plasty was drawn, incorporating the defect and placing the expected incisions within the relaxed skin tension lines where possible.  The area thus outlined was incised deep to adipose tissue with a #15 scalpel blade.  The skin margins were undermined to an appropriate distance in all directions utilizing iris scissors. The skin flaps were advanced over the defect.  The opposing margins were then approximated with interrupted buried subcutaneous sutures. Island Pedicle Flap Text: The defect edges were debeveled with a #15 scalpel blade.  Given the location of the defect, shape of the defect and the proximity to free margins an island pedicle advancement flap was deemed most appropriate.  Using a sterile surgical marker, an appropriate advancement flap was drawn incorporating the defect, outlining the appropriate donor tissue and placing the expected incisions within the relaxed skin tension lines where possible.    The area thus outlined was incised deep to adipose tissue with a #15 scalpel blade.  The skin margins were undermined to an appropriate distance in all directions around the primary defect and laterally outward around the island pedicle utilizing iris scissors.  There was minimal undermining beneath the pedicle flap. Island Pedicle Flap With Canthal Suspension Text: The defect edges were debeveled with a #15 scalpel blade.  Given the location of the defect, shape of the defect and the proximity to free margins an island pedicle advancement flap was deemed most appropriate.  Using a sterile surgical marker, an appropriate advancement flap was drawn incorporating the defect, outlining the appropriate donor tissue and placing the expected incisions within the relaxed skin tension lines where possible. The area thus outlined was incised deep to adipose tissue with a #15 scalpel blade.  The skin margins were undermined to an appropriate distance in all directions around the primary defect and laterally outward around the island pedicle utilizing iris scissors.  There was minimal undermining beneath the pedicle flap. A suspension suture was placed in the canthal tendon to prevent tension and prevent ectropion. Composite Graft Text: The defect edges were debeveled with a #15 scalpel blade.  Given the location of the defect, shape of the defect, the proximity to free margins and the fact the defect was full thickness a composite graft was deemed most appropriate.  The defect was outline and then transferred to the donor site.  A full thickness graft was then excised from the donor site. The graft was then placed in the primary defect, oriented appropriately and then sutured into place.  The secondary defect was then repaired using a primary closure. Graft Basting Suture (Optional): 5-0 Fast Absorbing Gut Same Histology In Subsequent Stages Text: The pattern and morphology of the tumor is as described in the first stage. O-L Flap Text: The defect edges were debeveled with a #15 scalpel blade.  Given the location of the defect, shape of the defect and the proximity to free margins an O-L flap was deemed most appropriate.  Using a sterile surgical marker, an appropriate advancement flap was drawn incorporating the defect and placing the expected incisions within the relaxed skin tension lines where possible.    The area thus outlined was incised deep to adipose tissue with a #15 scalpel blade.  The skin margins were undermined to an appropriate distance in all directions utilizing iris scissors. Consent (Ear)/Introductory Paragraph: The rationale for Mohs was explained to the patient and consent was obtained. The risks, benefits and alternatives to therapy were discussed in detail. Specifically, the risks of ear deformity, infection, scarring, bleeding, prolonged wound healing, incomplete removal, allergy to anesthesia, nerve injury and recurrence were addressed. Prior to the procedure, the treatment site was clearly identified and confirmed by the patient. All components of Universal Protocol/PAUSE Rule completed. Dressing: pressure dressing Epidermal Autograft Text: The defect edges were debeveled with a #15 scalpel blade.  Given the location of the defect, shape of the defect and the proximity to free margins an epidermal autograft was deemed most appropriate.  Using a sterile surgical marker, the primary defect shape was transferred to the donor site. The epidermal graft was then harvested.  The skin graft was then placed in the primary defect and oriented appropriately. Mohs Method Verbiage: An incision at a 45 degree angle following the standard Mohs approach was done and the specimen was harvested as a microscopic controlled layer. Spiral Flap Text: The defect edges were debeveled with a #15 scalpel blade.  Given the location of the defect, shape of the defect and the proximity to free margins a spiral flap was deemed most appropriate.  Using a sterile surgical marker, an appropriate rotation flap was drawn incorporating the defect and placing the expected incisions within the relaxed skin tension lines where possible. The area thus outlined was incised deep to adipose tissue with a #15 scalpel blade.  The skin margins were undermined to an appropriate distance in all directions utilizing iris scissors. Area L Indication Text: Tumors in this location are included in Area L (trunk and extremities).  Mohs surgery is indicated for larger tumors, or tumors with aggressive histologic features, in these anatomic locations. Detail Level: Detailed Modified Advancement Flap Text: The defect edges were debeveled with a #15 scalpel blade.  Given the location of the defect, shape of the defect and the proximity to free margins a modified advancement flap was deemed most appropriate.  Using a sterile surgical marker, an appropriate advancement flap was drawn incorporating the defect and placing the expected incisions within the relaxed skin tension lines where possible.    The area thus outlined was incised deep to adipose tissue with a #15 scalpel blade.  The skin margins were undermined to an appropriate distance in all directions utilizing iris scissors. Number Of Stages: 1 Epidermal Sutures: 4-0 Ethilon Home Suture Removal Text: Patient was provided instructions on removing sutures and will remove their sutures at home.  If they have any questions or difficulties they will call the office. Keystone Flap Text: The defect edges were debeveled with a #15 scalpel blade.  Given the location of the defect, shape of the defect a keystone flap was deemed most appropriate.  Using a sterile surgical marker, an appropriate keystone flap was drawn incorporating the defect, outlining the appropriate donor tissue and placing the expected incisions within the relaxed skin tension lines where possible. The area thus outlined was incised deep to adipose tissue with a #15 scalpel blade.  The skin margins were undermined to an appropriate distance in all directions around the primary defect and laterally outward around the flap utilizing iris scissors. Purse String (Simple) Text: Given the location of the defect and the characteristics of the surrounding skin a pursestring closure was deemed most appropriate.  Undermining was performed circumfirentially around the surgical defect.  A purstring suture was then placed and tightened. Rhombic Flap Text: The defect edges were debeveled with a #15 scalpel blade.  Given the location of the defect and the proximity to free margins a rhombic flap was deemed most appropriate.  Using a sterile surgical marker, an appropriate rhombic flap was drawn incorporating the defect.    The area thus outlined was incised deep to adipose tissue with a #15 scalpel blade.  The skin margins were undermined to an appropriate distance in all directions utilizing iris scissors. Referred To Oculoplastics For Closure Text (Leave Blank If You Do Not Want): After obtaining clear surgical margins the patient was sent to oculoplastics for surgical repair.  The patient understands they will receive post-surgical care and follow-up from the referring physician's office. Repair Type: Complex Repair Partial Purse String (Simple) Text: Given the location of the defect and the characteristics of the surrounding skin a simple purse string closure was deemed most appropriate.  Undermining was performed circumfirentially around the surgical defect.  A purse string suture was then placed and tightened. Wound tension only allowed a partial closure of the circular defect. Area H Indication Text: Tumors in this location are included in Area H (eyelids, eyebrows, nose, lips, chin, ear, pre-auricular, post-auricular, temple, genitalia, hands, feet, ankles and areola).  Tissue conservation is critical in these anatomic locations. Consent (Near Eyelid Margin)/Introductory Paragraph: The rationale for Mohs was explained to the patient and consent was obtained. The risks, benefits and alternatives to therapy were discussed in detail. Specifically, the risks of ectropion or eyelid deformity, infection, scarring, bleeding, prolonged wound healing, incomplete removal, allergy to anesthesia, nerve injury and recurrence were addressed. Prior to the procedure, the treatment site was clearly identified and confirmed by the patient. All components of Universal Protocol/PAUSE Rule completed. Transposition Flap Text: The defect edges were debeveled with a #15 scalpel blade.  Given the location of the defect and the proximity to free margins a transposition flap was deemed most appropriate.  Using a sterile surgical marker, an appropriate transposition flap was drawn incorporating the defect.    The area thus outlined was incised deep to adipose tissue with a #15 scalpel blade.  The skin margins were undermined to an appropriate distance in all directions utilizing iris scissors. Advancement Flap (Single) Text: The defect edges were debeveled with a #15 scalpel blade.  Given the location of the defect and the proximity to free margins a single advancement flap was deemed most appropriate.  Using a sterile surgical marker, an appropriate advancement flap was drawn incorporating the defect and placing the expected incisions within the relaxed skin tension lines where possible.    The area thus outlined was incised deep to adipose tissue with a #15 scalpel blade.  The skin margins were undermined to an appropriate distance in all directions utilizing iris scissors. Burow's Advancement Flap Text: The defect edges were debeveled with a #15 scalpel blade.  Given the location of the defect and the proximity to free margins a Burow's advancement flap was deemed most appropriate.  Using a sterile surgical marker, the appropriate advancement flap was drawn incorporating the defect and placing the expected incisions within the relaxed skin tension lines where possible.    The area thus outlined was incised deep to adipose tissue with a #15 scalpel blade.  The skin margins were undermined to an appropriate distance in all directions utilizing iris scissors. Island Pedicle Flap-Requiring Vessel Identification Text: The defect edges were debeveled with a #15 scalpel blade.  Given the location of the defect, shape of the defect and the proximity to free margins an island pedicle advancement flap was deemed most appropriate.  Using a sterile surgical marker, an appropriate advancement flap was drawn, based on the axial vessel mentioned above, incorporating the defect, outlining the appropriate donor tissue and placing the expected incisions within the relaxed skin tension lines where possible.    The area thus outlined was incised deep to adipose tissue with a #15 scalpel blade.  The skin margins were undermined to an appropriate distance in all directions around the primary defect and laterally outward around the island pedicle utilizing iris scissors.  There was minimal undermining beneath the pedicle flap. Mohs Histo Method Verbiage: Each section was then chromacoded and processed in the Mohs lab using the Mohs protocol and submitted for frozen section. Tarsorrhaphy Text: A tarsorrhaphy was performed using Frost sutures. Secondary Intention Text (Leave Blank If You Do Not Want): The defect will heal with secondary intention. Mohs Rapid Report Verbiage: The area of clinically evident tumor was marked with skin marking ink and appropriately hatched.  The initial incision was made following the Mohs approach through the skin.  The specimen was taken to the lab, divided into the necessary number of pieces, chromacoded and processed according to the Mohs protocol.  This was repeated in successive stages until a tumor free defect was achieved. Area M Indication Text: Tumors in this location are included in Area M (cheek, forehead, scalp, neck, jawline and pretibial skin).  Mohs surgery is indicated for tumors in these anatomic locations.

## 2019-09-03 NOTE — PROGRESS NOTE ADULT - ASSESSMENT
63 yr old male with H/O HTN, Multivessel CAD, HLD, Stented coronary artery, DM2, gastroesophageal reflux disease.  S/P CABG X4 POD # 0  Post op Hypovolemic shock, lactic acid acidosis, Anemia & hyperglycemia    Plan cont Post op Vent wean, titrate Fio2, Peep, F/u CXR, ABGs, Spo2, will wean ventilator  support once hemodynamics stabilized.  ST, low CVP, labile BP, cont Volume resuscitation, cont invasive hemodynamic monitoring, rising Lactates check ScVo2 will start Dobutamine gtt for Post op cardiovascular dysfunction.  Hct decrease 26 % lactate 7 will transfuse 1 units PRBC.  Monitor CT outputs.  ASA, Statin starting in AM.    Hyperglycemic, cont INS gt, BG Q 1hr    Monitor UO, BUN/Cr     Protonix for gastroesophageal reflux disease       I have spent 30 minutes providing critical care management to this patient.

## 2019-09-03 NOTE — BRIEF OPERATIVE NOTE - ADULT CT SURG CONDUIT HARVEST PERFORMED BY
LUCRECIA Brooke Endoscopic harvest of left greater saphenous vein/Ary Mnedoza open harvest of left radial art

## 2019-09-04 LAB
ALBUMIN SERPL ELPH-MCNC: 4.5 G/DL — SIGNIFICANT CHANGE UP (ref 3.3–5)
ALP SERPL-CCNC: 27 U/L — LOW (ref 40–120)
ALT FLD-CCNC: 26 U/L — SIGNIFICANT CHANGE UP (ref 10–45)
ANION GAP SERPL CALC-SCNC: 15 MMOL/L — SIGNIFICANT CHANGE UP (ref 5–17)
ANION GAP SERPL CALC-SCNC: 17 MMOL/L — SIGNIFICANT CHANGE UP (ref 5–17)
AST SERPL-CCNC: 122 U/L — HIGH (ref 10–40)
BASE EXCESS BLDV CALC-SCNC: -0.1 MMOL/L — SIGNIFICANT CHANGE UP (ref -2–2)
BASE EXCESS BLDV CALC-SCNC: -2.2 MMOL/L — LOW (ref -2–2)
BILIRUB SERPL-MCNC: 1.5 MG/DL — HIGH (ref 0.2–1.2)
BUN SERPL-MCNC: 13 MG/DL — SIGNIFICANT CHANGE UP (ref 7–23)
BUN SERPL-MCNC: 15 MG/DL — SIGNIFICANT CHANGE UP (ref 7–23)
CALCIUM SERPL-MCNC: 8.1 MG/DL — LOW (ref 8.4–10.5)
CALCIUM SERPL-MCNC: 8.3 MG/DL — LOW (ref 8.4–10.5)
CHLORIDE SERPL-SCNC: 101 MMOL/L — SIGNIFICANT CHANGE UP (ref 96–108)
CHLORIDE SERPL-SCNC: 102 MMOL/L — SIGNIFICANT CHANGE UP (ref 96–108)
CO2 BLDV-SCNC: 25 MMOL/L — SIGNIFICANT CHANGE UP (ref 22–30)
CO2 BLDV-SCNC: 27 MMOL/L — SIGNIFICANT CHANGE UP (ref 22–30)
CO2 SERPL-SCNC: 21 MMOL/L — LOW (ref 22–31)
CO2 SERPL-SCNC: 22 MMOL/L — SIGNIFICANT CHANGE UP (ref 22–31)
CREAT SERPL-MCNC: 1.11 MG/DL — SIGNIFICANT CHANGE UP (ref 0.5–1.3)
CREAT SERPL-MCNC: 1.12 MG/DL — SIGNIFICANT CHANGE UP (ref 0.5–1.3)
GAS PNL BLDA: SIGNIFICANT CHANGE UP
GAS PNL BLDV: SIGNIFICANT CHANGE UP
GLUCOSE BLDC GLUCOMTR-MCNC: 114 MG/DL — HIGH (ref 70–99)
GLUCOSE BLDC GLUCOMTR-MCNC: 119 MG/DL — HIGH (ref 70–99)
GLUCOSE BLDC GLUCOMTR-MCNC: 123 MG/DL — HIGH (ref 70–99)
GLUCOSE BLDC GLUCOMTR-MCNC: 142 MG/DL — HIGH (ref 70–99)
GLUCOSE BLDC GLUCOMTR-MCNC: 149 MG/DL — HIGH (ref 70–99)
GLUCOSE BLDC GLUCOMTR-MCNC: 165 MG/DL — HIGH (ref 70–99)
GLUCOSE BLDC GLUCOMTR-MCNC: 172 MG/DL — HIGH (ref 70–99)
GLUCOSE BLDC GLUCOMTR-MCNC: 89 MG/DL — SIGNIFICANT CHANGE UP (ref 70–99)
GLUCOSE SERPL-MCNC: 109 MG/DL — HIGH (ref 70–99)
GLUCOSE SERPL-MCNC: 173 MG/DL — HIGH (ref 70–99)
HCO3 BLDV-SCNC: 24 MMOL/L — SIGNIFICANT CHANGE UP (ref 21–29)
HCO3 BLDV-SCNC: 25 MMOL/L — SIGNIFICANT CHANGE UP (ref 21–29)
HCT VFR BLD CALC: 30.1 % — LOW (ref 39–50)
HGB BLD-MCNC: 10.3 G/DL — LOW (ref 13–17)
HOROWITZ INDEX BLDV+IHG-RTO: 40 — SIGNIFICANT CHANGE UP
HOROWITZ INDEX BLDV+IHG-RTO: 40 — SIGNIFICANT CHANGE UP
MAGNESIUM SERPL-MCNC: 2.2 MG/DL — SIGNIFICANT CHANGE UP (ref 1.6–2.6)
MCHC RBC-ENTMCNC: 29.6 PG — SIGNIFICANT CHANGE UP (ref 27–34)
MCHC RBC-ENTMCNC: 34.2 GM/DL — SIGNIFICANT CHANGE UP (ref 32–36)
MCV RBC AUTO: 86.5 FL — SIGNIFICANT CHANGE UP (ref 80–100)
PCO2 BLDV: 47 MMHG — SIGNIFICANT CHANGE UP (ref 35–50)
PCO2 BLDV: 50 MMHG — SIGNIFICANT CHANGE UP (ref 35–50)
PH BLDV: 7.3 — LOW (ref 7.35–7.45)
PH BLDV: 7.35 — SIGNIFICANT CHANGE UP (ref 7.35–7.45)
PHOSPHATE SERPL-MCNC: 1.9 MG/DL — LOW (ref 2.5–4.5)
PLATELET # BLD AUTO: 142 K/UL — LOW (ref 150–400)
PO2 BLDV: 38 MMHG — SIGNIFICANT CHANGE UP (ref 25–45)
PO2 BLDV: 42 MMHG — SIGNIFICANT CHANGE UP (ref 25–45)
POTASSIUM SERPL-MCNC: 5.3 MMOL/L — SIGNIFICANT CHANGE UP (ref 3.5–5.3)
POTASSIUM SERPL-MCNC: 5.6 MMOL/L — HIGH (ref 3.5–5.3)
POTASSIUM SERPL-SCNC: 5.3 MMOL/L — SIGNIFICANT CHANGE UP (ref 3.5–5.3)
POTASSIUM SERPL-SCNC: 5.6 MMOL/L — HIGH (ref 3.5–5.3)
PROT SERPL-MCNC: 5.7 G/DL — LOW (ref 6–8.3)
RBC # BLD: 3.48 M/UL — LOW (ref 4.2–5.8)
RBC # FLD: 13.6 % — SIGNIFICANT CHANGE UP (ref 10.3–14.5)
SAO2 % BLDV: 68 % — SIGNIFICANT CHANGE UP (ref 67–88)
SAO2 % BLDV: 72 % — SIGNIFICANT CHANGE UP (ref 67–88)
SODIUM SERPL-SCNC: 138 MMOL/L — SIGNIFICANT CHANGE UP (ref 135–145)
SODIUM SERPL-SCNC: 140 MMOL/L — SIGNIFICANT CHANGE UP (ref 135–145)
TSH SERPL-MCNC: 3.2 UIU/ML — SIGNIFICANT CHANGE UP (ref 0.27–4.2)
WBC # BLD: 11.1 K/UL — HIGH (ref 3.8–10.5)
WBC # FLD AUTO: 11.1 K/UL — HIGH (ref 3.8–10.5)

## 2019-09-04 PROCEDURE — 93010 ELECTROCARDIOGRAM REPORT: CPT | Mod: 77

## 2019-09-04 PROCEDURE — 71045 X-RAY EXAM CHEST 1 VIEW: CPT | Mod: 26

## 2019-09-04 PROCEDURE — 93010 ELECTROCARDIOGRAM REPORT: CPT

## 2019-09-04 PROCEDURE — 99291 CRITICAL CARE FIRST HOUR: CPT

## 2019-09-04 RX ORDER — CALCIUM GLUCONATE 100 MG/ML
1 VIAL (ML) INTRAVENOUS ONCE
Refills: 0 | Status: COMPLETED | OUTPATIENT
Start: 2019-09-04 | End: 2019-09-04

## 2019-09-04 RX ORDER — MILRINONE LACTATE 1 MG/ML
0.12 INJECTION, SOLUTION INTRAVENOUS
Qty: 20 | Refills: 0 | Status: DISCONTINUED | OUTPATIENT
Start: 2019-09-04 | End: 2019-09-04

## 2019-09-04 RX ORDER — OXYCODONE AND ACETAMINOPHEN 5; 325 MG/1; MG/1
2 TABLET ORAL EVERY 6 HOURS
Refills: 0 | Status: DISCONTINUED | OUTPATIENT
Start: 2019-09-04 | End: 2019-09-05

## 2019-09-04 RX ORDER — FUROSEMIDE 40 MG
10 TABLET ORAL ONCE
Refills: 0 | Status: COMPLETED | OUTPATIENT
Start: 2019-09-04 | End: 2019-09-04

## 2019-09-04 RX ORDER — FENTANYL CITRATE 50 UG/ML
25 INJECTION INTRAVENOUS ONCE
Refills: 0 | Status: DISCONTINUED | OUTPATIENT
Start: 2019-09-04 | End: 2019-09-04

## 2019-09-04 RX ORDER — ALBUMIN HUMAN 25 %
250 VIAL (ML) INTRAVENOUS ONCE
Refills: 0 | Status: COMPLETED | OUTPATIENT
Start: 2019-09-04 | End: 2019-09-04

## 2019-09-04 RX ORDER — ATORVASTATIN CALCIUM 80 MG/1
40 TABLET, FILM COATED ORAL AT BEDTIME
Refills: 0 | Status: DISCONTINUED | OUTPATIENT
Start: 2019-09-04 | End: 2019-09-08

## 2019-09-04 RX ORDER — INSULIN LISPRO 100/ML
VIAL (ML) SUBCUTANEOUS
Refills: 0 | Status: DISCONTINUED | OUTPATIENT
Start: 2019-09-04 | End: 2019-09-08

## 2019-09-04 RX ORDER — HYDROMORPHONE HYDROCHLORIDE 2 MG/ML
0.5 INJECTION INTRAMUSCULAR; INTRAVENOUS; SUBCUTANEOUS ONCE
Refills: 0 | Status: DISCONTINUED | OUTPATIENT
Start: 2019-09-04 | End: 2019-09-04

## 2019-09-04 RX ORDER — PANTOPRAZOLE SODIUM 20 MG/1
40 TABLET, DELAYED RELEASE ORAL
Refills: 0 | Status: DISCONTINUED | OUTPATIENT
Start: 2019-09-05 | End: 2019-09-08

## 2019-09-04 RX ORDER — CLOPIDOGREL BISULFATE 75 MG/1
75 TABLET, FILM COATED ORAL DAILY
Refills: 0 | Status: DISCONTINUED | OUTPATIENT
Start: 2019-09-04 | End: 2019-09-08

## 2019-09-04 RX ORDER — OXYCODONE AND ACETAMINOPHEN 5; 325 MG/1; MG/1
1 TABLET ORAL EVERY 4 HOURS
Refills: 0 | Status: DISCONTINUED | OUTPATIENT
Start: 2019-09-04 | End: 2019-09-04

## 2019-09-04 RX ORDER — POTASSIUM CHLORIDE 20 MEQ
20 PACKET (EA) ORAL ONCE
Refills: 0 | Status: COMPLETED | OUTPATIENT
Start: 2019-09-04 | End: 2019-09-04

## 2019-09-04 RX ORDER — INFLUENZA VIRUS VACCINE 15; 15; 15; 15 UG/.5ML; UG/.5ML; UG/.5ML; UG/.5ML
0.5 SUSPENSION INTRAMUSCULAR ONCE
Refills: 0 | Status: DISCONTINUED | OUTPATIENT
Start: 2019-09-04 | End: 2019-09-05

## 2019-09-04 RX ORDER — ACETAMINOPHEN 500 MG
1000 TABLET ORAL ONCE
Refills: 0 | Status: COMPLETED | OUTPATIENT
Start: 2019-09-04 | End: 2019-09-04

## 2019-09-04 RX ORDER — MAGNESIUM SULFATE 500 MG/ML
1 VIAL (ML) INJECTION ONCE
Refills: 0 | Status: COMPLETED | OUTPATIENT
Start: 2019-09-04 | End: 2019-09-04

## 2019-09-04 RX ADMIN — Medication 1000 MILLIGRAM(S): at 15:05

## 2019-09-04 RX ADMIN — Medication 5: at 17:16

## 2019-09-04 RX ADMIN — Medication 100 GRAM(S): at 14:54

## 2019-09-04 RX ADMIN — Medication 20 MILLIEQUIVALENT(S): at 09:07

## 2019-09-04 RX ADMIN — FENTANYL CITRATE 25 MICROGRAM(S): 50 INJECTION INTRAVENOUS at 06:00

## 2019-09-04 RX ADMIN — Medication 100 MILLIGRAM(S): at 13:01

## 2019-09-04 RX ADMIN — Medication 325 MILLIGRAM(S): at 11:52

## 2019-09-04 RX ADMIN — Medication 1000 MILLIGRAM(S): at 06:30

## 2019-09-04 RX ADMIN — CHLORHEXIDINE GLUCONATE 5 MILLILITER(S): 213 SOLUTION TOPICAL at 02:53

## 2019-09-04 RX ADMIN — Medication 5: at 21:30

## 2019-09-04 RX ADMIN — Medication 400 MILLIGRAM(S): at 14:35

## 2019-09-04 RX ADMIN — HYDROMORPHONE HYDROCHLORIDE 0.5 MILLIGRAM(S): 2 INJECTION INTRAMUSCULAR; INTRAVENOUS; SUBCUTANEOUS at 21:31

## 2019-09-04 RX ADMIN — Medication 100 MILLIGRAM(S): at 13:00

## 2019-09-04 RX ADMIN — Medication 62.5 MILLIMOLE(S): at 04:46

## 2019-09-04 RX ADMIN — OXYCODONE AND ACETAMINOPHEN 1 TABLET(S): 5; 325 TABLET ORAL at 20:56

## 2019-09-04 RX ADMIN — Medication 100 MILLIGRAM(S): at 05:14

## 2019-09-04 RX ADMIN — HYDROMORPHONE HYDROCHLORIDE 0.5 MILLIGRAM(S): 2 INJECTION INTRAMUSCULAR; INTRAVENOUS; SUBCUTANEOUS at 13:38

## 2019-09-04 RX ADMIN — Medication 200 GRAM(S): at 17:20

## 2019-09-04 RX ADMIN — Medication 100 MILLIGRAM(S): at 21:07

## 2019-09-04 RX ADMIN — Medication 10 MILLIGRAM(S): at 05:15

## 2019-09-04 RX ADMIN — Medication 400 MILLIGRAM(S): at 06:00

## 2019-09-04 RX ADMIN — Medication 100 MILLIGRAM(S): at 21:06

## 2019-09-04 RX ADMIN — Medication 10 MILLIGRAM(S): at 21:07

## 2019-09-04 RX ADMIN — CHLORHEXIDINE GLUCONATE 1 APPLICATION(S): 213 SOLUTION TOPICAL at 05:15

## 2019-09-04 RX ADMIN — INSULIN HUMAN 3 UNIT(S)/HR: 100 INJECTION, SOLUTION SUBCUTANEOUS at 09:08

## 2019-09-04 RX ADMIN — Medication 125 MILLILITER(S): at 14:31

## 2019-09-04 RX ADMIN — Medication 10 MILLIGRAM(S): at 07:37

## 2019-09-04 RX ADMIN — HYDROMORPHONE HYDROCHLORIDE 0.5 MILLIGRAM(S): 2 INJECTION INTRAMUSCULAR; INTRAVENOUS; SUBCUTANEOUS at 13:08

## 2019-09-04 RX ADMIN — OXYCODONE AND ACETAMINOPHEN 1 TABLET(S): 5; 325 TABLET ORAL at 21:45

## 2019-09-04 RX ADMIN — Medication 10 MILLIGRAM(S): at 13:00

## 2019-09-04 RX ADMIN — ATORVASTATIN CALCIUM 40 MILLIGRAM(S): 80 TABLET, FILM COATED ORAL at 21:32

## 2019-09-04 RX ADMIN — HYDROMORPHONE HYDROCHLORIDE 0.5 MILLIGRAM(S): 2 INJECTION INTRAMUSCULAR; INTRAVENOUS; SUBCUTANEOUS at 21:46

## 2019-09-04 RX ADMIN — MILRINONE LACTATE 4.87 MICROGRAM(S)/KG/MIN: 1 INJECTION, SOLUTION INTRAVENOUS at 09:08

## 2019-09-04 RX ADMIN — CHLORHEXIDINE GLUCONATE 5 MILLILITER(S): 213 SOLUTION TOPICAL at 05:15

## 2019-09-04 RX ADMIN — PANTOPRAZOLE SODIUM 40 MILLIGRAM(S): 20 TABLET, DELAYED RELEASE ORAL at 11:52

## 2019-09-04 RX ADMIN — FENTANYL CITRATE 25 MICROGRAM(S): 50 INJECTION INTRAVENOUS at 06:30

## 2019-09-04 RX ADMIN — CLOPIDOGREL BISULFATE 75 MILLIGRAM(S): 75 TABLET, FILM COATED ORAL at 08:39

## 2019-09-04 RX ADMIN — NICARDIPINE HYDROCHLORIDE 25 MG/HR: 30 CAPSULE, EXTENDED RELEASE ORAL at 00:00

## 2019-09-04 RX ADMIN — CHLORHEXIDINE GLUCONATE 15 MILLILITER(S): 213 SOLUTION TOPICAL at 05:15

## 2019-09-04 NOTE — PHYSICAL THERAPY INITIAL EVALUATION ADULT - GENERAL OBSERVATIONS, REHAB EVAL
Pt aren 40 min eval fair. Pt s/p CABG, 2 venous & 2 arterial. Pt rec'd in bed, pacer, premedicated, NAD. Pt A&Ox3, minimally verbal, min eye contact, follows commands. PT reviewed sternal precautions.

## 2019-09-04 NOTE — PHYSICAL THERAPY INITIAL EVALUATION ADULT - ADDITIONAL COMMENTS
Pt states he lives in an apartment with daughter with 3 steps to enter and a flight of steps to apartment. Pt states prior to admission being independent with all functional mobility and ADLs.

## 2019-09-04 NOTE — PHYSICAL THERAPY INITIAL EVALUATION ADULT - GAIT TRAINING, PT EVAL
GOAL: Pt will ambulate 150 feet with least restrictive device as appropriate independently in 4 weeks

## 2019-09-04 NOTE — AIRWAY REMOVAL NOTE  ADULT & PEDS - POSITIVE LEAK TEST
Yes. I can sign it as temporary. Please reprint all forms attached and leave at my desk. Thanks   done

## 2019-09-04 NOTE — AIRWAY REMOVAL NOTE  ADULT & PEDS - ARTIFICAL AIRWAY REMOVAL COMMENTS
Written order for extubation verified. The patient was identified by full name and birth date compared to the identification band. Present during the procedure was DR. Nicole

## 2019-09-04 NOTE — PROGRESS NOTE ADULT - SUBJECTIVE AND OBJECTIVE BOX
ALANNA LEE   MRN#: [id number]     The patient is a 63y Male who was seen, evaluated, & examined with the CTICU staff post-operatively with a multidisciplinary care plan formulated & implemented.  All available clinical, laboratory, radiographic, pharmacologic, and electrocardiographic data were reviewed & analyzed.      The patient was in the CTICU in critical condition secondary to:     persistent cardiopulmonary dysfunction  cardiogenic shock-cardiovascular dysfunction  hyperlactatemia-acidosis    For support and evaluation & prevention of further decompensation secondary to persistent cardiopulmonary dysfunction and cardiogenic shock-cardiovascular dysfunction, respiratory status required:     supplemental oxygen with nasal cannula  continuous pulse oximetry monitoring  following ABGs with A-line monitoring    Invasive hemodynamic monitoring with an A-line was required for continuous MAP/BP monitoring to ensure adequate cardiovascular support and to evaluate for & help prevent decompensation while receiving IV Primacor infusion secondary to cardiogenic shock-cardiovascular dysfunction    In addition:  CABG with normal biventricular function complicated by residual lactate  On Dobutamine and Cardene overnight, now transitioned to Primacor with decreasing lactate  Central venous sat 60s - will continue Primacor until lactate normalizes - will also hydrate as he is net negative today  ASA, Plavix, statin - deferring beta blocker    The patient required critical care management and I personally provided 30 minutes of non-continuous care to the patient, excluding separate procedures, in addition to  discussing the patient and plan at length with the CTICU staff and helping coordinate care.

## 2019-09-04 NOTE — PHYSICAL THERAPY INITIAL EVALUATION ADULT - PLANNED THERAPY INTERVENTIONS, PT EVAL
balance training/bed mobility training/transfer training/gait training/strengthening/GOAL: Pt will negotiate 9 steps with unilateral handrail in a step-to pattern independently in 4 weeks

## 2019-09-04 NOTE — PHYSICAL THERAPY INITIAL EVALUATION ADULT - TRANSFER TRAINING, PT EVAL
GOAL: Pt will transfer sit to/from stand with least restrictive device as appropriate independently in 4 weeks

## 2019-09-04 NOTE — PHYSICAL THERAPY INITIAL EVALUATION ADULT - PERTINENT HX OF CURRENT PROBLEM, REHAB EVAL
64 y/o M with PMH of HTN, HLD, T2DM, GERD and CAD s/p STEPHANE (mRCA 2016) on Plavix and aspirin. Pt states that he has been experiencing exertional chest pain dyspnea for the past 3 months. Pt's symptoms are relieved with a few minutes of rest. Pt was seen by cardiology  sent for a stress test which was positive for ischemia in the lateral, inferolateral, apical and apical inferior walls with EF 53%. Pt now presents s/p CABG, 2 arterial and 2 venous 9/3/19.

## 2019-09-05 DIAGNOSIS — Z71.89 OTHER SPECIFIED COUNSELING: ICD-10-CM

## 2019-09-05 DIAGNOSIS — Z95.1 PRESENCE OF AORTOCORONARY BYPASS GRAFT: ICD-10-CM

## 2019-09-05 LAB
ALBUMIN SERPL ELPH-MCNC: 4 G/DL — SIGNIFICANT CHANGE UP (ref 3.3–5)
ALBUMIN SERPL ELPH-MCNC: 4.3 G/DL — SIGNIFICANT CHANGE UP (ref 3.3–5)
ALP SERPL-CCNC: 31 U/L — LOW (ref 40–120)
ALP SERPL-CCNC: 33 U/L — LOW (ref 40–120)
ALT FLD-CCNC: 30 U/L — SIGNIFICANT CHANGE UP (ref 10–45)
ALT FLD-CCNC: 31 U/L — SIGNIFICANT CHANGE UP (ref 10–45)
ANION GAP SERPL CALC-SCNC: 11 MMOL/L — SIGNIFICANT CHANGE UP (ref 5–17)
ANION GAP SERPL CALC-SCNC: 13 MMOL/L — SIGNIFICANT CHANGE UP (ref 5–17)
AST SERPL-CCNC: 122 U/L — HIGH (ref 10–40)
AST SERPL-CCNC: 135 U/L — HIGH (ref 10–40)
BILIRUB SERPL-MCNC: 0.7 MG/DL — SIGNIFICANT CHANGE UP (ref 0.2–1.2)
BILIRUB SERPL-MCNC: 0.8 MG/DL — SIGNIFICANT CHANGE UP (ref 0.2–1.2)
BUN SERPL-MCNC: 17 MG/DL — SIGNIFICANT CHANGE UP (ref 7–23)
BUN SERPL-MCNC: 19 MG/DL — SIGNIFICANT CHANGE UP (ref 7–23)
CALCIUM SERPL-MCNC: 8.6 MG/DL — SIGNIFICANT CHANGE UP (ref 8.4–10.5)
CALCIUM SERPL-MCNC: 8.7 MG/DL — SIGNIFICANT CHANGE UP (ref 8.4–10.5)
CHLORIDE SERPL-SCNC: 100 MMOL/L — SIGNIFICANT CHANGE UP (ref 96–108)
CHLORIDE SERPL-SCNC: 98 MMOL/L — SIGNIFICANT CHANGE UP (ref 96–108)
CO2 SERPL-SCNC: 24 MMOL/L — SIGNIFICANT CHANGE UP (ref 22–31)
CO2 SERPL-SCNC: 25 MMOL/L — SIGNIFICANT CHANGE UP (ref 22–31)
CREAT SERPL-MCNC: 1.28 MG/DL — SIGNIFICANT CHANGE UP (ref 0.5–1.3)
CREAT SERPL-MCNC: 1.38 MG/DL — HIGH (ref 0.5–1.3)
GAS PNL BLDA: SIGNIFICANT CHANGE UP
GLUCOSE BLDC GLUCOMTR-MCNC: 159 MG/DL — HIGH (ref 70–99)
GLUCOSE BLDC GLUCOMTR-MCNC: 160 MG/DL — HIGH (ref 70–99)
GLUCOSE BLDC GLUCOMTR-MCNC: 171 MG/DL — HIGH (ref 70–99)
GLUCOSE BLDC GLUCOMTR-MCNC: 186 MG/DL — HIGH (ref 70–99)
GLUCOSE SERPL-MCNC: 180 MG/DL — HIGH (ref 70–99)
GLUCOSE SERPL-MCNC: 199 MG/DL — HIGH (ref 70–99)
HCT VFR BLD CALC: 30.4 % — LOW (ref 39–50)
HGB BLD-MCNC: 9.9 G/DL — LOW (ref 13–17)
MAGNESIUM SERPL-MCNC: 2.3 MG/DL — SIGNIFICANT CHANGE UP (ref 1.6–2.6)
MCHC RBC-ENTMCNC: 28.8 PG — SIGNIFICANT CHANGE UP (ref 27–34)
MCHC RBC-ENTMCNC: 32.5 GM/DL — SIGNIFICANT CHANGE UP (ref 32–36)
MCV RBC AUTO: 88.7 FL — SIGNIFICANT CHANGE UP (ref 80–100)
PHOSPHATE SERPL-MCNC: 3.2 MG/DL — SIGNIFICANT CHANGE UP (ref 2.5–4.5)
PLATELET # BLD AUTO: 149 K/UL — LOW (ref 150–400)
POTASSIUM SERPL-MCNC: 5 MMOL/L — SIGNIFICANT CHANGE UP (ref 3.5–5.3)
POTASSIUM SERPL-MCNC: 5.1 MMOL/L — SIGNIFICANT CHANGE UP (ref 3.5–5.3)
POTASSIUM SERPL-SCNC: 5 MMOL/L — SIGNIFICANT CHANGE UP (ref 3.5–5.3)
POTASSIUM SERPL-SCNC: 5.1 MMOL/L — SIGNIFICANT CHANGE UP (ref 3.5–5.3)
PROT SERPL-MCNC: 6.1 G/DL — SIGNIFICANT CHANGE UP (ref 6–8.3)
PROT SERPL-MCNC: 6.3 G/DL — SIGNIFICANT CHANGE UP (ref 6–8.3)
RBC # BLD: 3.43 M/UL — LOW (ref 4.2–5.8)
RBC # FLD: 13.8 % — SIGNIFICANT CHANGE UP (ref 10.3–14.5)
SODIUM SERPL-SCNC: 135 MMOL/L — SIGNIFICANT CHANGE UP (ref 135–145)
SODIUM SERPL-SCNC: 136 MMOL/L — SIGNIFICANT CHANGE UP (ref 135–145)
WBC # BLD: 12.8 K/UL — HIGH (ref 3.8–10.5)
WBC # FLD AUTO: 12.8 K/UL — HIGH (ref 3.8–10.5)

## 2019-09-05 PROCEDURE — 93010 ELECTROCARDIOGRAM REPORT: CPT

## 2019-09-05 PROCEDURE — 71045 X-RAY EXAM CHEST 1 VIEW: CPT | Mod: 26,76

## 2019-09-05 PROCEDURE — 99233 SBSQ HOSP IP/OBS HIGH 50: CPT

## 2019-09-05 RX ORDER — SODIUM CHLORIDE 9 MG/ML
3 INJECTION INTRAMUSCULAR; INTRAVENOUS; SUBCUTANEOUS EVERY 8 HOURS
Refills: 0 | Status: DISCONTINUED | OUTPATIENT
Start: 2019-09-05 | End: 2019-09-08

## 2019-09-05 RX ORDER — FUROSEMIDE 40 MG
20 TABLET ORAL ONCE
Refills: 0 | Status: COMPLETED | OUTPATIENT
Start: 2019-09-05 | End: 2019-09-05

## 2019-09-05 RX ORDER — FUROSEMIDE 40 MG
40 TABLET ORAL DAILY
Refills: 0 | Status: DISCONTINUED | OUTPATIENT
Start: 2019-09-06 | End: 2019-09-06

## 2019-09-05 RX ORDER — METOPROLOL TARTRATE 50 MG
25 TABLET ORAL
Refills: 0 | Status: DISCONTINUED | OUTPATIENT
Start: 2019-09-05 | End: 2019-09-05

## 2019-09-05 RX ORDER — HYDROMORPHONE HYDROCHLORIDE 2 MG/ML
0.5 INJECTION INTRAMUSCULAR; INTRAVENOUS; SUBCUTANEOUS ONCE
Refills: 0 | Status: DISCONTINUED | OUTPATIENT
Start: 2019-09-05 | End: 2019-09-05

## 2019-09-05 RX ORDER — METFORMIN HYDROCHLORIDE 850 MG/1
1000 TABLET ORAL
Refills: 0 | Status: DISCONTINUED | OUTPATIENT
Start: 2019-09-05 | End: 2019-09-08

## 2019-09-05 RX ORDER — METOPROLOL TARTRATE 50 MG
50 TABLET ORAL
Refills: 0 | Status: DISCONTINUED | OUTPATIENT
Start: 2019-09-05 | End: 2019-09-06

## 2019-09-05 RX ORDER — METOPROLOL TARTRATE 50 MG
25 TABLET ORAL ONCE
Refills: 0 | Status: COMPLETED | OUTPATIENT
Start: 2019-09-05 | End: 2019-09-05

## 2019-09-05 RX ORDER — ENOXAPARIN SODIUM 100 MG/ML
40 INJECTION SUBCUTANEOUS DAILY
Refills: 0 | Status: DISCONTINUED | OUTPATIENT
Start: 2019-09-05 | End: 2019-09-08

## 2019-09-05 RX ADMIN — OXYCODONE AND ACETAMINOPHEN 2 TABLET(S): 5; 325 TABLET ORAL at 01:15

## 2019-09-05 RX ADMIN — Medication 20 MILLIGRAM(S): at 04:14

## 2019-09-05 RX ADMIN — Medication 8: at 08:50

## 2019-09-05 RX ADMIN — METFORMIN HYDROCHLORIDE 1000 MILLIGRAM(S): 850 TABLET ORAL at 18:05

## 2019-09-05 RX ADMIN — Medication 25 MILLIGRAM(S): at 08:51

## 2019-09-05 RX ADMIN — SODIUM CHLORIDE 3 MILLILITER(S): 9 INJECTION INTRAMUSCULAR; INTRAVENOUS; SUBCUTANEOUS at 21:53

## 2019-09-05 RX ADMIN — PANTOPRAZOLE SODIUM 40 MILLIGRAM(S): 20 TABLET, DELAYED RELEASE ORAL at 05:28

## 2019-09-05 RX ADMIN — CLOPIDOGREL BISULFATE 75 MILLIGRAM(S): 75 TABLET, FILM COATED ORAL at 11:53

## 2019-09-05 RX ADMIN — METFORMIN HYDROCHLORIDE 1000 MILLIGRAM(S): 850 TABLET ORAL at 08:51

## 2019-09-05 RX ADMIN — Medication 325 MILLIGRAM(S): at 11:53

## 2019-09-05 RX ADMIN — Medication 50 MILLIGRAM(S): at 18:05

## 2019-09-05 RX ADMIN — Medication 5: at 12:06

## 2019-09-05 RX ADMIN — Medication 100 MILLIGRAM(S): at 21:53

## 2019-09-05 RX ADMIN — Medication 100 MILLIGRAM(S): at 05:28

## 2019-09-05 RX ADMIN — Medication 100 MILLIGRAM(S): at 15:58

## 2019-09-05 RX ADMIN — Medication 5: at 17:49

## 2019-09-05 RX ADMIN — ENOXAPARIN SODIUM 40 MILLIGRAM(S): 100 INJECTION SUBCUTANEOUS at 11:53

## 2019-09-05 RX ADMIN — Medication 25 MILLIGRAM(S): at 13:07

## 2019-09-05 RX ADMIN — HYDROMORPHONE HYDROCHLORIDE 0.5 MILLIGRAM(S): 2 INJECTION INTRAMUSCULAR; INTRAVENOUS; SUBCUTANEOUS at 05:56

## 2019-09-05 RX ADMIN — CHLORHEXIDINE GLUCONATE 1 APPLICATION(S): 213 SOLUTION TOPICAL at 05:28

## 2019-09-05 RX ADMIN — ATORVASTATIN CALCIUM 40 MILLIGRAM(S): 80 TABLET, FILM COATED ORAL at 21:53

## 2019-09-05 RX ADMIN — OXYCODONE AND ACETAMINOPHEN 2 TABLET(S): 5; 325 TABLET ORAL at 00:22

## 2019-09-05 RX ADMIN — Medication 10 MILLIGRAM(S): at 05:28

## 2019-09-05 RX ADMIN — HYDROMORPHONE HYDROCHLORIDE 0.5 MILLIGRAM(S): 2 INJECTION INTRAMUSCULAR; INTRAVENOUS; SUBCUTANEOUS at 06:15

## 2019-09-05 RX ADMIN — Medication 5: at 21:53

## 2019-09-05 RX ADMIN — Medication 10 MILLIGRAM(S): at 15:58

## 2019-09-05 RX ADMIN — OXYCODONE AND ACETAMINOPHEN 2 TABLET(S): 5; 325 TABLET ORAL at 14:33

## 2019-09-05 NOTE — PROGRESS NOTE ADULT - SUBJECTIVE AND OBJECTIVE BOX
ALANNA LEE  MRN-[id number]    Patient is a 63y old  Male who presents with a chief complaint of CABG (04 Sep 2019 11:34)    HPI:  62 y/o M w/ PMH of HTN, HLD, DM-II, GERD and CAD s/p STEPHANE (mRCA ) on Plavix and aspirin was found to have multi-vessel coronary artery disease and is now s/p CABG. Pt states that he has been experiencing exertional chest pain dyspnea for the past 3 months. Pt's symptoms are relived with a few minutes of rest. Pt was seen by cardiology  sent for a stress test which was positive for ischemia in the lateral, inferolateral, apical and apical inferior walls with EF 53%. Pt denies N/V/D, fevers, chills, cough, palpitations, syncope, orthopnea, nocturnal paroxysmal dyspnea, edema, cyanosis, heart murmurs, varicosities, phlebitis, claudication. Pt was evaluated by CT surgery s/p cardiac cath dx multivessel disease . Presents to PST for scheduled  Coronary Artery Bypass Grafting x 3 on 19 ( as per family surgery was rescheduled).     PAST MEDICAL & SURGICAL HISTORY:  CAD (coronary artery disease): 2 stents -   Stented coronary artery  Other hyperlipidemia  Essential hypertension  DM (diabetes mellitus)  S/P cardiac cath: 19  Scrotal abscess: s/p I&amp; D-   Elective surgical procedure: RLE metal jessica in place-     FAMILY HISTORY:  Unremarkable    Social History:  Denies smoking, illicit drug use or frequent alcohol consumption    Allergies  No Known Allergies    MEDICATIONS  (STANDING):  albumin human  5% IVPB 250 milliLiter(s) IV Intermittent once  aspirin enteric coated 325 milliGRAM(s) Oral daily  atorvastatin 40 milliGRAM(s) Oral at bedtime  chlorhexidine 2% Cloths 1 Application(s) Topical <User Schedule>  clopidogrel Tablet 75 milliGRAM(s) Oral daily  dextrose 50% Injectable 50 milliLiter(s) IV Push every 15 minutes  dextrose 50% Injectable 25 milliLiter(s) IV Push every 15 minutes  docusate sodium 100 milliGRAM(s) Oral three times a day  influenza   Vaccine 0.5 milliLiter(s) IntraMuscular once  insulin lispro (HumaLOG) corrective regimen sliding scale   SubCutaneous Before meals and at bedtime  metoclopramide Injectable 10 milliGRAM(s) IV Push every 8 hours  pantoprazole    Tablet 40 milliGRAM(s) Oral before breakfast  sodium chloride 0.9%. 1000 milliLiter(s) (10 mL/Hr) IV Continuous <Continuous>    MEDICATIONS  (PRN):  oxyCODONE    5 mG/acetaminophen 325 mG 1 Tablet(s) Oral every 4 hours PRN Moderate Pain (4 - 6)  oxyCODONE    5 mG/acetaminophen 325 mG 2 Tablet(s) Oral every 6 hours PRN Severe Pain (7 - 10)      Review of Systems:  Constitutional:  Negative for weight change, fever, malaise  HEENT:  Negative for sinus pain, hoarseness, sore throat, dysphagia, vision changes  Cardiovascular:  Negative for chest pain, palpitations, dizziness  Respiratory:  Negative for cough, wheezing, dyspnea  Gastrointestinal:  Negative for nausea, vomiting, diarrhea, melena  Musculoskeletal:  Negative for pain, swelling, stiffness   Neuro:  Negative for weakness, numbness, headache  Psych:  Negative for anxiety, depression  Endocrine:  Negative for polyuria, polydipsia, temperature Intolerance    All other systems negative unless otherwise stated    ICU Vital Signs Last 24 Hrs  T(C): 37 (05 Sep 2019 06:00), Max: 38 (05 Sep 2019 04:00)  T(F): 98.6 (05 Sep 2019 06:00), Max: 100.4 (05 Sep 2019 04:00)  HR: 107 (05 Sep 2019 07:00) (91 - 108)  BP: --  BP(mean): --  ABP: 145/54 (05 Sep 2019 07:00) (91/40 - 169/63)  ABP(mean): 79 (05 Sep 2019 07:00) (54 - 94)  RR: 27 (05 Sep 2019 07:00) (7 - 41)  SpO2: 98% (05 Sep 2019 07:00) (96% - 100%)    Daily     Daily Weight in k.7 (05 Sep 2019 00:00)  I&O's Summary    04 Sep 2019 07:01  -  05 Sep 2019 07:00  --------------------------------------------------------  IN: 1201.1 mL / OUT: 2850 mL / NET: -1648.9 mL    Physical Exam:  Gen: Alert, no apparent distress  CV: Regular rate and rhythm no murmurs, rubs or gallops  Pulm: Clear to auscultation bilaterally, no rales, rhonchi or wheezes  Chest: Chest tubes/drains in place with dressings clean, dry and intact  GI: Abd is soft, non-tender and non-distended with +BS  Ext: No clubbing, cyanosis or edema  Neuro: A+Ox3, follows commands and moves all extremities    Labs:                          9.9    12.8  )-----------( 149      ( 05 Sep 2019 02:04 )             30.4       09-    135  |  100  |  17  ----------------------------<  180<H>  5.1   |  24  |  1.28    Ca    8.6      05 Sep 2019 02:03  Phos  3.2     -  Mg     2.3     -05    TPro  6.1  /  Alb  4.3  /  TBili  0.7  /  DBili  x   /  AST  135<H>  /  ALT  31  /  AlkPhos  31<L>  09-  PT/INR - ( 03 Sep 2019 16:37 )   PT: 16.5 sec;   INR: 1.43 ratio    PTT - ( 03 Sep 2019 16:37 )  PTT:30.0 sec    Assessment/Plan: 62 y/o M w/ PMH of HTN, HLD, DM-II, GERD and CAD s/p STEPHANE (mRCA 2016) on Plavix and aspirin was found to have multi-vessel coronary artery disease and is now s/p CABG.    Neuro:   Pain control with Percocet                                           Cardiovascular:    Stable hemodynamics  Not on any pressors  ASA, Plavix, Lipitor  May start calcium channel blocker for radial graft  Also needs beta blocker if he is not started on Diltiazem  Continue hemodynamic monitoring    Respiratory:  Pt is comfortable on nasal cannula  Encourage incentive spirometry    GI:  Tolerating diabetic diet  Continue bowel regimen, Protonix, Reglan  Continue Zofran for nausea - PRN	          Renal:  Lasix overnight  Monitor I/Os and electrolytes, d/c Lipscomb    Hematologic / Oncology:  No signs of active bleeding, H/H stable  Monitor chest tube output    HSQ for DVT ppl    Infectious disease:  All surgical incision / chest tube sites look clean  No fever or other signs of infection     Endocrine:  Continue Accuchecks with coverage    All clinical, lab, hemodynamic and radiographic data were reviewed and the plan was discussed with CTICU team.     Tato Carrasquillo MD

## 2019-09-05 NOTE — PROGRESS NOTE ADULT - SUBJECTIVE AND OBJECTIVE BOX
S:  co incisional pain.   POD # 2 CABGx4    Telemetry:      Vital Signs Last 24 Hrs  T(C): 37.9 (19 @ 14:11), Max: 38 (19 @ 04:00)  T(F): 100.2 (19 @ 14:11), Max: 100.4 (19 @ 04:00)  HR: 103 (19 14:11) (92 - 110)  BP: 125/81 (19 @ 14:11) (118/69 - 159/76)  RR: 20 (19 @ 14:11) (20 - 41)  SpO2: 96% (19 14:11) (96% - 100%)                    07:01  -   @ 07:00  --------------------------------------------------------  IN: 1201.1 mL / OUT: 2850 mL / NET: -1648.9 mL     07:01  -   @ 14:49  --------------------------------------------------------  IN: 120 mL / OUT: 275 mL / NET: -155 mL          Daily     Daily Weight in k.7 (05 Sep 2019 00:00)        CAPILLARY BLOOD GLUCOSE  183 (05 Sep 2019 12:00)  201 (05 Sep 2019 08:00)  144 (04 Sep 2019 15:00)      POCT Blood Glucose.: 186 mg/dL (05 Sep 2019 12:04)  POCT Blood Glucose.: 172 mg/dL (04 Sep 2019 17:12)          Drains:     MS         [  ] Drainage:                 L Pleural  [  ]  Drainage:                R Pleural  [  ]  Drainage:    Pacing Wires        [ x ]   Settings:    VVI 40                              Isolated  [  ]    Coumadin    [ ] YES          [ x ]      NO         Reason:                                 9.9    12.8  )-----------( 149      ( 05 Sep 2019 02:04 )             30.4           136  |  98  |  19  ----------------------------<  199<H>  5.0   |  25  |  1.38<H>    Ca    8.7      05 Sep 2019 11:47  Phos  3.2       Mg     2.3         TPro  6.3  /  Alb  4.0  /  TBili  0.8  /  DBili  x   /  AST  122<H>  /  ALT  30  /  AlkPhos  33<L>        PT/INR - ( 03 Sep 2019 16:37 )   PT: 16.5 sec;   INR: 1.43 ratio         PTT - ( 03 Sep 2019 16:37 )  PTT:30.0 sec    PHYSICAL EXAM:    Neurology: alert and oriented x 3, nonfocal, no gross deficits    CV :  S1S2 heard tachycardia    Sternal Wound :  , Stable--dressing intact    Lungs:  decreased BS bilateral.  No w/r/r    Abdomen: soft, nontender, nondistended, positive bowel sounds, last bowel movement              Extremities:    left radial harvest site dressing CDI .  hand cool but good capillary refill        aspirin enteric coated 325 milliGRAM(s) Oral daily  atorvastatin 40 milliGRAM(s) Oral at bedtime  chlorhexidine 2% Cloths 1 Application(s) Topical <User Schedule>  clopidogrel Tablet 75 milliGRAM(s) Oral daily  dextrose 50% Injectable 50 milliLiter(s) IV Push every 15 minutes  dextrose 50% Injectable 25 milliLiter(s) IV Push every 15 minutes  docusate sodium 100 milliGRAM(s) Oral three times a day  enoxaparin Injectable 40 milliGRAM(s) SubCutaneous daily  insulin lispro (HumaLOG) corrective regimen sliding scale   SubCutaneous Before meals and at bedtime  metFORMIN 1000 milliGRAM(s) Oral two times a day  metoclopramide Injectable 10 milliGRAM(s) IV Push every 8 hours  metoprolol tartrate 50 milliGRAM(s) Oral two times a day  oxyCODONE    5 mG/acetaminophen 325 mG 1 Tablet(s) Oral every 4 hours PRN  oxyCODONE    5 mG/acetaminophen 325 mG 2 Tablet(s) Oral every 6 hours PRN  pantoprazole    Tablet 40 milliGRAM(s) Oral before breakfast  sodium chloride 0.9%. 1000 milliLiter(s) IV Continuous <Continuous>                              Physical Therapy Rec:   Home  [  ]   Home w/ PT  [  ]  Rehab  [  ]  disposition --PND    Discussed with Cardiothoracic Team at AM rounds.

## 2019-09-05 NOTE — PROGRESS NOTE ADULT - PROBLEM SELECTOR PLAN 1
ASA/BB/Statin  increase BB as tolerated  pain management  OOB amb  PT disposition PND  encourage cough and deep breathing  VVI 40

## 2019-09-05 NOTE — PROGRESS NOTE ADULT - ASSESSMENT
63 M  POD #2  CABGx4.  Hx STEPHANE RCA on plavix.  EF 55%.  Remained in CTU for HD support.  DM2 on metformin and corrective insulin.  +PW VVI 40

## 2019-09-06 DIAGNOSIS — E87.70 FLUID OVERLOAD, UNSPECIFIED: ICD-10-CM

## 2019-09-06 LAB
ANION GAP SERPL CALC-SCNC: 15 MMOL/L — SIGNIFICANT CHANGE UP (ref 5–17)
BUN SERPL-MCNC: 32 MG/DL — HIGH (ref 7–23)
CALCIUM SERPL-MCNC: 9 MG/DL — SIGNIFICANT CHANGE UP (ref 8.4–10.5)
CHLORIDE SERPL-SCNC: 97 MMOL/L — SIGNIFICANT CHANGE UP (ref 96–108)
CO2 SERPL-SCNC: 25 MMOL/L — SIGNIFICANT CHANGE UP (ref 22–31)
CREAT SERPL-MCNC: 1.49 MG/DL — HIGH (ref 0.5–1.3)
GLUCOSE BLDC GLUCOMTR-MCNC: 141 MG/DL — HIGH (ref 70–99)
GLUCOSE BLDC GLUCOMTR-MCNC: 152 MG/DL — HIGH (ref 70–99)
GLUCOSE BLDC GLUCOMTR-MCNC: 159 MG/DL — HIGH (ref 70–99)
GLUCOSE BLDC GLUCOMTR-MCNC: 161 MG/DL — HIGH (ref 70–99)
GLUCOSE SERPL-MCNC: 141 MG/DL — HIGH (ref 70–99)
HCT VFR BLD CALC: 30.3 % — LOW (ref 39–50)
HGB BLD-MCNC: 9.9 G/DL — LOW (ref 13–17)
MCHC RBC-ENTMCNC: 28.8 PG — SIGNIFICANT CHANGE UP (ref 27–34)
MCHC RBC-ENTMCNC: 32.5 GM/DL — SIGNIFICANT CHANGE UP (ref 32–36)
MCV RBC AUTO: 88.6 FL — SIGNIFICANT CHANGE UP (ref 80–100)
PLATELET # BLD AUTO: 166 K/UL — SIGNIFICANT CHANGE UP (ref 150–400)
POTASSIUM SERPL-MCNC: 4.7 MMOL/L — SIGNIFICANT CHANGE UP (ref 3.5–5.3)
POTASSIUM SERPL-SCNC: 4.7 MMOL/L — SIGNIFICANT CHANGE UP (ref 3.5–5.3)
RBC # BLD: 3.42 M/UL — LOW (ref 4.2–5.8)
RBC # FLD: 13.9 % — SIGNIFICANT CHANGE UP (ref 10.3–14.5)
SODIUM SERPL-SCNC: 137 MMOL/L — SIGNIFICANT CHANGE UP (ref 135–145)
WBC # BLD: 12.9 K/UL — HIGH (ref 3.8–10.5)
WBC # FLD AUTO: 12.9 K/UL — HIGH (ref 3.8–10.5)

## 2019-09-06 PROCEDURE — 71045 X-RAY EXAM CHEST 1 VIEW: CPT | Mod: 26

## 2019-09-06 RX ORDER — METOPROLOL TARTRATE 50 MG
75 TABLET ORAL
Refills: 0 | Status: DISCONTINUED | OUTPATIENT
Start: 2019-09-06 | End: 2019-09-08

## 2019-09-06 RX ORDER — ALBUTEROL 90 UG/1
1 AEROSOL, METERED ORAL EVERY 4 HOURS
Refills: 0 | Status: DISCONTINUED | OUTPATIENT
Start: 2019-09-06 | End: 2019-09-08

## 2019-09-06 RX ORDER — IPRATROPIUM/ALBUTEROL SULFATE 18-103MCG
3 AEROSOL WITH ADAPTER (GRAM) INHALATION ONCE
Refills: 0 | Status: COMPLETED | OUTPATIENT
Start: 2019-09-06 | End: 2019-09-06

## 2019-09-06 RX ORDER — IPRATROPIUM/ALBUTEROL SULFATE 18-103MCG
3 AEROSOL WITH ADAPTER (GRAM) INHALATION EVERY 6 HOURS
Refills: 0 | Status: DISCONTINUED | OUTPATIENT
Start: 2019-09-06 | End: 2019-09-08

## 2019-09-06 RX ORDER — METOPROLOL TARTRATE 50 MG
25 TABLET ORAL ONCE
Refills: 0 | Status: COMPLETED | OUTPATIENT
Start: 2019-09-06 | End: 2019-09-06

## 2019-09-06 RX ORDER — FUROSEMIDE 40 MG
20 TABLET ORAL ONCE
Refills: 0 | Status: COMPLETED | OUTPATIENT
Start: 2019-09-06 | End: 2019-09-06

## 2019-09-06 RX ORDER — FUROSEMIDE 40 MG
40 TABLET ORAL
Refills: 0 | Status: DISCONTINUED | OUTPATIENT
Start: 2019-09-06 | End: 2019-09-07

## 2019-09-06 RX ORDER — TIOTROPIUM BROMIDE 18 UG/1
1 CAPSULE ORAL; RESPIRATORY (INHALATION) DAILY
Refills: 0 | Status: DISCONTINUED | OUTPATIENT
Start: 2019-09-06 | End: 2019-09-08

## 2019-09-06 RX ADMIN — Medication 40 MILLIGRAM(S): at 18:08

## 2019-09-06 RX ADMIN — Medication 3 MILLILITER(S): at 18:01

## 2019-09-06 RX ADMIN — Medication 325 MILLIGRAM(S): at 12:18

## 2019-09-06 RX ADMIN — Medication 3 MILLILITER(S): at 10:16

## 2019-09-06 RX ADMIN — SODIUM CHLORIDE 3 MILLILITER(S): 9 INJECTION INTRAMUSCULAR; INTRAVENOUS; SUBCUTANEOUS at 22:23

## 2019-09-06 RX ADMIN — PANTOPRAZOLE SODIUM 40 MILLIGRAM(S): 20 TABLET, DELAYED RELEASE ORAL at 06:04

## 2019-09-06 RX ADMIN — Medication 40 MILLIGRAM(S): at 06:03

## 2019-09-06 RX ADMIN — ENOXAPARIN SODIUM 40 MILLIGRAM(S): 100 INJECTION SUBCUTANEOUS at 12:19

## 2019-09-06 RX ADMIN — Medication 5: at 12:18

## 2019-09-06 RX ADMIN — Medication 20 MILLIGRAM(S): at 11:45

## 2019-09-06 RX ADMIN — Medication 100 MILLIGRAM(S): at 06:03

## 2019-09-06 RX ADMIN — Medication 25 MILLIGRAM(S): at 10:01

## 2019-09-06 RX ADMIN — METFORMIN HYDROCHLORIDE 1000 MILLIGRAM(S): 850 TABLET ORAL at 18:00

## 2019-09-06 RX ADMIN — METFORMIN HYDROCHLORIDE 1000 MILLIGRAM(S): 850 TABLET ORAL at 06:04

## 2019-09-06 RX ADMIN — CLOPIDOGREL BISULFATE 75 MILLIGRAM(S): 75 TABLET, FILM COATED ORAL at 12:18

## 2019-09-06 RX ADMIN — SODIUM CHLORIDE 3 MILLILITER(S): 9 INJECTION INTRAMUSCULAR; INTRAVENOUS; SUBCUTANEOUS at 06:04

## 2019-09-06 RX ADMIN — ATORVASTATIN CALCIUM 40 MILLIGRAM(S): 80 TABLET, FILM COATED ORAL at 21:20

## 2019-09-06 RX ADMIN — Medication 50 MILLIGRAM(S): at 06:04

## 2019-09-06 RX ADMIN — Medication 5: at 17:59

## 2019-09-06 RX ADMIN — Medication 5: at 22:05

## 2019-09-06 RX ADMIN — Medication 100 MILLIGRAM(S): at 12:19

## 2019-09-06 RX ADMIN — CHLORHEXIDINE GLUCONATE 1 APPLICATION(S): 213 SOLUTION TOPICAL at 08:08

## 2019-09-06 RX ADMIN — Medication 3 MILLILITER(S): at 12:19

## 2019-09-06 RX ADMIN — Medication 75 MILLIGRAM(S): at 18:01

## 2019-09-06 RX ADMIN — Medication 2: at 08:08

## 2019-09-06 RX ADMIN — SODIUM CHLORIDE 3 MILLILITER(S): 9 INJECTION INTRAMUSCULAR; INTRAVENOUS; SUBCUTANEOUS at 12:40

## 2019-09-06 NOTE — PROGRESS NOTE ADULT - ASSESSMENT
63 M  POD #2  CABGx4.  Hx STEPHANE RCA on plavix.  EF 55%.  Remained in CTU for HD support.  DM2 on metformin and corrective insulin.  +PW VVI 40 63 M  POD #2  CABGx4.  Hx STEPHANE RCA on plavix.  EF 55%.  Remained in CTU for HD support.  DM2 on metformin and corrective insulin.  +PW VVI 40  postop course unremarkable  9/6 VSS; + exp wheezing- duoneb initiated; ck chest xray

## 2019-09-06 NOTE — PROGRESS NOTE ADULT - PROBLEM SELECTOR PLAN 1
ASA/BB/Statin  increase BB as tolerated  pain management  OOB amb  PT disposition PND  encourage cough and deep breathing  VVI 40 continue postop care  continue asa and statin   increase lopressor 75 mg po bid   isolate pw   pain management  diuresis  increase activity as tolerated  discharge planning- rehab monday if stable

## 2019-09-06 NOTE — PROGRESS NOTE ADULT - SUBJECTIVE AND OBJECTIVE BOX
VITAL SIGNS    Telemetry:    Vital Signs Last 24 Hrs  T(C): 37.1 (19 @ 06:04), Max: 37.9 (19 @ 14:11)  T(F): 98.8 (19 @ 06:04), Max: 100.2 (19 @ 14:11)  HR: 101 (19 @ 06:04) (89 - 109)  BP: 130/84 (19 @ 06:04) (115/75 - 159/76)  RR: 18 (19 @ 06:04) (18 - 30)  SpO2: 92% (19 @ 06:04) (92% - 99%)             @ 07:01  -   @ 07:00  --------------------------------------------------------  IN: 220 mL / OUT: 975 mL / NET: -755 mL     @ 07:01  -   @ 12:37  --------------------------------------------------------  IN: 600 mL / OUT: 600 mL / NET: 0 mL       Daily     Daily Weight in k.1 (06 Sep 2019 07:43)  Admit Wt: Drug Dosing Weight  Height (cm): 165.1 (03 Sep 2019 08:25)  Weight (kg): 81.1 (03 Sep 2019 08:25)  BMI (kg/m2): 29.8 (03 Sep 2019 08:25)  BSA (m2): 1.89 (03 Sep 2019 08:25)      CAPILLARY BLOOD GLUCOSE      POCT Blood Glucose.: 161 mg/dL (06 Sep 2019 11:58)  POCT Blood Glucose.: 141 mg/dL (06 Sep 2019 07:49)  POCT Blood Glucose.: 159 mg/dL (05 Sep 2019 21:37)  POCT Blood Glucose.: 171 mg/dL (05 Sep 2019 19:23)  POCT Blood Glucose.: 160 mg/dL (05 Sep 2019 17:47)          ALBUTerol    90 MICROgram(s) HFA Inhaler 1 Puff(s) Inhalation every 4 hours  ALBUTerol/ipratropium for Nebulization 3 milliLiter(s) Nebulizer every 6 hours  aspirin enteric coated 325 milliGRAM(s) Oral daily  atorvastatin 40 milliGRAM(s) Oral at bedtime  chlorhexidine 2% Cloths 1 Application(s) Topical <User Schedule>  clopidogrel Tablet 75 milliGRAM(s) Oral daily  dextrose 50% Injectable 50 milliLiter(s) IV Push every 15 minutes  dextrose 50% Injectable 25 milliLiter(s) IV Push every 15 minutes  docusate sodium 100 milliGRAM(s) Oral three times a day  enoxaparin Injectable 40 milliGRAM(s) SubCutaneous daily  furosemide   Injectable 40 milliGRAM(s) IV Push <User Schedule>  insulin lispro (HumaLOG) corrective regimen sliding scale   SubCutaneous Before meals and at bedtime  metFORMIN 1000 milliGRAM(s) Oral two times a day  metoprolol tartrate 75 milliGRAM(s) Oral <User Schedule>  oxyCODONE    5 mG/acetaminophen 325 mG 1 Tablet(s) Oral every 4 hours PRN  oxyCODONE    5 mG/acetaminophen 325 mG 2 Tablet(s) Oral every 6 hours PRN  pantoprazole    Tablet 40 milliGRAM(s) Oral before breakfast  sodium chloride 0.9% lock flush 3 milliLiter(s) IV Push every 8 hours  sodium chloride 0.9%. 1000 milliLiter(s) IV Continuous <Continuous>  tiotropium 18 MICROgram(s) Capsule 1 Capsule(s) Inhalation daily      PHYSICAL EXAM    Subjective: "Hi.   Neurology: alert and oriented x 3, nonfocal, no gross deficits  CV : tele:  RSR  Sternal Wound :  CDI with dressing , Stable  Lungs: clear. RR easy, unlabored   Abdomen: soft, nontender, nondistended, positive bowel sounds, bowel movement   Neg N/V/D   :  pt voiding without difficulty   Extremities:   PAREDES; edema, neg calf tenderness.   PPP bilaterally      PW:  Chest tubes: VITAL SIGNS    Telemetry:  rsr/ st    Vital Signs Last 24 Hrs  T(C): 37.1 (19 @ 06:04), Max: 37.9 (19 @ 14:11)  T(F): 98.8 (19 @ 06:04), Max: 100.2 (19 @ 14:11)  HR: 101 (19 @ 06:04) (89 - 109)  BP: 130/84 (19 @ 06:04) (115/75 - 159/76)  RR: 18 (19 @ 06:04) (18 - 30)  SpO2: 92% (19 @ 06:04) (92% - 99%)             @ 07:01  -   @ 07:00  --------------------------------------------------------  IN: 220 mL / OUT: 975 mL / NET: -755 mL     @ 07:01  -   @ 12:37  --------------------------------------------------------  IN: 600 mL / OUT: 600 mL / NET: 0 mL       Daily     Daily Weight in k.1 (06 Sep 2019 07:43)  Admit Wt: Drug Dosing Weight  Height (cm): 165.1 (03 Sep 2019 08:25)  Weight (kg): 81.1 (03 Sep 2019 08:25)  BMI (kg/m2): 29.8 (03 Sep 2019 08:25)  BSA (m2): 1.89 (03 Sep 2019 08:25)      CAPILLARY BLOOD GLUCOSE      POCT Blood Glucose.: 161 mg/dL (06 Sep 2019 11:58)  POCT Blood Glucose.: 141 mg/dL (06 Sep 2019 07:49)  POCT Blood Glucose.: 159 mg/dL (05 Sep 2019 21:37)  POCT Blood Glucose.: 171 mg/dL (05 Sep 2019 19:23)  POCT Blood Glucose.: 160 mg/dL (05 Sep 2019 17:47)          ALBUTerol    90 MICROgram(s) HFA Inhaler 1 Puff(s) Inhalation every 4 hours  ALBUTerol/ipratropium for Nebulization 3 milliLiter(s) Nebulizer every 6 hours  aspirin enteric coated 325 milliGRAM(s) Oral daily  atorvastatin 40 milliGRAM(s) Oral at bedtime  chlorhexidine 2% Cloths 1 Application(s) Topical <User Schedule>  clopidogrel Tablet 75 milliGRAM(s) Oral daily  dextrose 50% Injectable 50 milliLiter(s) IV Push every 15 minutes  dextrose 50% Injectable 25 milliLiter(s) IV Push every 15 minutes  docusate sodium 100 milliGRAM(s) Oral three times a day  enoxaparin Injectable 40 milliGRAM(s) SubCutaneous daily  furosemide   Injectable 40 milliGRAM(s) IV Push <User Schedule>  insulin lispro (HumaLOG) corrective regimen sliding scale   SubCutaneous Before meals and at bedtime  metFORMIN 1000 milliGRAM(s) Oral two times a day  metoprolol tartrate 75 milliGRAM(s) Oral <User Schedule>  oxyCODONE    5 mG/acetaminophen 325 mG 1 Tablet(s) Oral every 4 hours PRN  oxyCODONE    5 mG/acetaminophen 325 mG 2 Tablet(s) Oral every 6 hours PRN  pantoprazole    Tablet 40 milliGRAM(s) Oral before breakfast  sodium chloride 0.9% lock flush 3 milliLiter(s) IV Push every 8 hours  sodium chloride 0.9%. 1000 milliLiter(s) IV Continuous <Continuous>  tiotropium 18 MICROgram(s) Capsule 1 Capsule(s) Inhalation daily      PHYSICAL EXAM    Subjective: "I feel ok."   Neurology: alert and oriented x 3, nonfocal, no gross deficits  CV : tele:  RSR/ st    Sternal Wound :  CDI with dressing , Stable; +pw isolated  Lungs: exp wheezing- cleared after duoneb;  RR easy, unlabored   Abdomen: soft, nontender, nondistended, positive bowel sounds, + bowel movement   Neg N/V/D; obese abdomen   :  pt voiding without difficulty   Extremities:   PAREDES; +1 LE edema, neg calf tenderness.   PPP bilaterally; left radial site cdi andrew; + left leg with AB     PW: + isolated  Chest tubes: none

## 2019-09-07 LAB
ANION GAP SERPL CALC-SCNC: 19 MMOL/L — HIGH (ref 5–17)
BUN SERPL-MCNC: 41 MG/DL — HIGH (ref 7–23)
CALCIUM SERPL-MCNC: 9.3 MG/DL — SIGNIFICANT CHANGE UP (ref 8.4–10.5)
CHLORIDE SERPL-SCNC: 91 MMOL/L — LOW (ref 96–108)
CO2 SERPL-SCNC: 26 MMOL/L — SIGNIFICANT CHANGE UP (ref 22–31)
CREAT SERPL-MCNC: 1.48 MG/DL — HIGH (ref 0.5–1.3)
GLUCOSE BLDC GLUCOMTR-MCNC: 122 MG/DL — HIGH (ref 70–99)
GLUCOSE BLDC GLUCOMTR-MCNC: 149 MG/DL — HIGH (ref 70–99)
GLUCOSE BLDC GLUCOMTR-MCNC: 170 MG/DL — HIGH (ref 70–99)
GLUCOSE BLDC GLUCOMTR-MCNC: 204 MG/DL — HIGH (ref 70–99)
GLUCOSE SERPL-MCNC: 134 MG/DL — HIGH (ref 70–99)
HCT VFR BLD CALC: 35.5 % — LOW (ref 39–50)
HGB BLD-MCNC: 10.6 G/DL — LOW (ref 13–17)
MCHC RBC-ENTMCNC: 26.5 PG — LOW (ref 27–34)
MCHC RBC-ENTMCNC: 30 GM/DL — LOW (ref 32–36)
MCV RBC AUTO: 88.4 FL — SIGNIFICANT CHANGE UP (ref 80–100)
PLATELET # BLD AUTO: 230 K/UL — SIGNIFICANT CHANGE UP (ref 150–400)
POTASSIUM SERPL-MCNC: 3.7 MMOL/L — SIGNIFICANT CHANGE UP (ref 3.5–5.3)
POTASSIUM SERPL-SCNC: 3.7 MMOL/L — SIGNIFICANT CHANGE UP (ref 3.5–5.3)
RBC # BLD: 4.01 M/UL — LOW (ref 4.2–5.8)
RBC # FLD: 14 % — SIGNIFICANT CHANGE UP (ref 10.3–14.5)
SODIUM SERPL-SCNC: 136 MMOL/L — SIGNIFICANT CHANGE UP (ref 135–145)
WBC # BLD: 12.4 K/UL — HIGH (ref 3.8–10.5)
WBC # FLD AUTO: 12.4 K/UL — HIGH (ref 3.8–10.5)

## 2019-09-07 RX ADMIN — Medication 325 MILLIGRAM(S): at 12:47

## 2019-09-07 RX ADMIN — PANTOPRAZOLE SODIUM 40 MILLIGRAM(S): 20 TABLET, DELAYED RELEASE ORAL at 06:02

## 2019-09-07 RX ADMIN — CHLORHEXIDINE GLUCONATE 1 APPLICATION(S): 213 SOLUTION TOPICAL at 10:01

## 2019-09-07 RX ADMIN — METFORMIN HYDROCHLORIDE 1000 MILLIGRAM(S): 850 TABLET ORAL at 08:29

## 2019-09-07 RX ADMIN — Medication 5: at 16:33

## 2019-09-07 RX ADMIN — Medication 100 MILLIGRAM(S): at 21:52

## 2019-09-07 RX ADMIN — SODIUM CHLORIDE 3 MILLILITER(S): 9 INJECTION INTRAMUSCULAR; INTRAVENOUS; SUBCUTANEOUS at 12:43

## 2019-09-07 RX ADMIN — Medication 3 MILLILITER(S): at 00:33

## 2019-09-07 RX ADMIN — Medication 75 MILLIGRAM(S): at 06:02

## 2019-09-07 RX ADMIN — METFORMIN HYDROCHLORIDE 1000 MILLIGRAM(S): 850 TABLET ORAL at 16:36

## 2019-09-07 RX ADMIN — SODIUM CHLORIDE 3 MILLILITER(S): 9 INJECTION INTRAMUSCULAR; INTRAVENOUS; SUBCUTANEOUS at 06:07

## 2019-09-07 RX ADMIN — Medication 75 MILLIGRAM(S): at 16:36

## 2019-09-07 RX ADMIN — Medication 2: at 08:29

## 2019-09-07 RX ADMIN — ATORVASTATIN CALCIUM 40 MILLIGRAM(S): 80 TABLET, FILM COATED ORAL at 21:52

## 2019-09-07 RX ADMIN — ENOXAPARIN SODIUM 40 MILLIGRAM(S): 100 INJECTION SUBCUTANEOUS at 12:46

## 2019-09-07 RX ADMIN — Medication 3 MILLILITER(S): at 12:48

## 2019-09-07 RX ADMIN — Medication 2: at 21:53

## 2019-09-07 RX ADMIN — Medication 40 MILLIGRAM(S): at 06:01

## 2019-09-07 RX ADMIN — Medication 8: at 12:46

## 2019-09-07 RX ADMIN — CLOPIDOGREL BISULFATE 75 MILLIGRAM(S): 75 TABLET, FILM COATED ORAL at 12:47

## 2019-09-07 RX ADMIN — SODIUM CHLORIDE 3 MILLILITER(S): 9 INJECTION INTRAMUSCULAR; INTRAVENOUS; SUBCUTANEOUS at 21:51

## 2019-09-07 RX ADMIN — Medication 3 MILLILITER(S): at 06:02

## 2019-09-07 RX ADMIN — Medication 3 MILLILITER(S): at 16:37

## 2019-09-07 NOTE — PROGRESS NOTE ADULT - PROBLEM SELECTOR PLAN 1
continue postop care  continue asa and statin   increase lopressor 75 mg po bid   isolate pw   pain management  diuresis  increase activity as tolerated  discharge planning- rehab monday if stable continue postop care  continue asa and statin   continue lopressor 75 mg po bid   d/c pw today   pain management  diuresis  increase activity as tolerated  discharge planning- home sunday

## 2019-09-07 NOTE — PROGRESS NOTE ADULT - PROBLEM SELECTOR PLAN 3
diuresis- change lasix to iv  strict I & O's   daily weight   ck chest xray
diuresis- change lasix to iv  strict I & O's   daily weight   ck chest xray

## 2019-09-07 NOTE — PROGRESS NOTE ADULT - SUBJECTIVE AND OBJECTIVE BOX
VITAL SIGNS    Telemetry:    Vital Signs Last 24 Hrs  T(C): 36.8 (19 @ 05:47), Max: 37.2 (19 @ 21:00)  T(F): 98.2 (19 @ 05:47), Max: 99 (19 @ 21:00)  HR: 100 (19 @ 05:47) (96 - 110)  BP: 115/70 (19 @ 05:47) (104/73 - 136/80)  RR: 16 (19 @ 05:47) (16 - 18)  SpO2: 93% (19 @ 05:47) (92% - 96%)             @ 07:01  -   @ 07:00  --------------------------------------------------------  IN: 800 mL / OUT: 2300 mL / NET: -1500 mL     07:01  -   @ 11:04  --------------------------------------------------------  IN: 240 mL / OUT: 250 mL / NET: -10 mL       Daily     Daily Weight in k.6 (07 Sep 2019 07:22)  Admit Wt: Drug Dosing Weight  Height (cm): 165.1 (03 Sep 2019 08:25)  Weight (kg): 81.1 (03 Sep 2019 08:25)  BMI (kg/m2): 29.8 (03 Sep 2019 08:25)  BSA (m2): 1.89 (03 Sep 2019 08:25)      CAPILLARY BLOOD GLUCOSE      POCT Blood Glucose.: 149 mg/dL (07 Sep 2019 08:09)  POCT Blood Glucose.: 152 mg/dL (06 Sep 2019 21:48)  POCT Blood Glucose.: 159 mg/dL (06 Sep 2019 17:08)  POCT Blood Glucose.: 161 mg/dL (06 Sep 2019 11:58)          ALBUTerol    90 MICROgram(s) HFA Inhaler 1 Puff(s) Inhalation every 4 hours  ALBUTerol/ipratropium for Nebulization 3 milliLiter(s) Nebulizer every 6 hours  aspirin enteric coated 325 milliGRAM(s) Oral daily  atorvastatin 40 milliGRAM(s) Oral at bedtime  chlorhexidine 2% Cloths 1 Application(s) Topical <User Schedule>  clopidogrel Tablet 75 milliGRAM(s) Oral daily  dextrose 50% Injectable 50 milliLiter(s) IV Push every 15 minutes  dextrose 50% Injectable 25 milliLiter(s) IV Push every 15 minutes  docusate sodium 100 milliGRAM(s) Oral three times a day  enoxaparin Injectable 40 milliGRAM(s) SubCutaneous daily  insulin lispro (HumaLOG) corrective regimen sliding scale   SubCutaneous Before meals and at bedtime  metFORMIN 1000 milliGRAM(s) Oral two times a day  metoprolol tartrate 75 milliGRAM(s) Oral <User Schedule>  oxyCODONE    5 mG/acetaminophen 325 mG 1 Tablet(s) Oral every 4 hours PRN  oxyCODONE    5 mG/acetaminophen 325 mG 2 Tablet(s) Oral every 6 hours PRN  pantoprazole    Tablet 40 milliGRAM(s) Oral before breakfast  sodium chloride 0.9% lock flush 3 milliLiter(s) IV Push every 8 hours  sodium chloride 0.9%. 1000 milliLiter(s) IV Continuous <Continuous>  tiotropium 18 MICROgram(s) Capsule 1 Capsule(s) Inhalation daily      PHYSICAL EXAM    Subjective: "Hi.   Neurology: alert and oriented x 3, nonfocal, no gross deficits  CV : tele:  RSR  Sternal Wound :  CDI with dressing , Stable  Lungs: clear. RR easy, unlabored   Abdomen: soft, nontender, nondistended, positive bowel sounds, bowel movement   Neg N/V/D   :  pt voiding without difficulty   Extremities:   PAREDES; edema, neg calf tenderness.   PPP bilaterally      PW:  Chest tubes: VITAL SIGNS    Telemetry:  rsr 80-90   Vital Signs Last 24 Hrs  T(C): 36.8 (19 @ 05:47), Max: 37.2 (19 @ 21:00)  T(F): 98.2 (19 @ 05:47), Max: 99 (19 @ 21:00)  HR: 100 (19 @ 05:47) (96 - 110)  BP: 115/70 (19 @ 05:47) (104/73 - 136/80)  RR: 16 (19 @ 05:47) (16 - 18)  SpO2: 93% (19 @ 05:47) (92% - 96%)             @ 07:01  -   @ 07:00  --------------------------------------------------------  IN: 800 mL / OUT: 2300 mL / NET: -1500 mL     @ 07:01  -   @ 11:04  --------------------------------------------------------  IN: 240 mL / OUT: 250 mL / NET: -10 mL       Daily     Daily Weight in k.6 (07 Sep 2019 07:22)  Admit Wt: Drug Dosing Weight  Height (cm): 165.1 (03 Sep 2019 08:25)  Weight (kg): 81.1 (03 Sep 2019 08:25)  BMI (kg/m2): 29.8 (03 Sep 2019 08:25)  BSA (m2): 1.89 (03 Sep 2019 08:25)      CAPILLARY BLOOD GLUCOSE      POCT Blood Glucose.: 149 mg/dL (07 Sep 2019 08:09)  POCT Blood Glucose.: 152 mg/dL (06 Sep 2019 21:48)  POCT Blood Glucose.: 159 mg/dL (06 Sep 2019 17:08)  POCT Blood Glucose.: 161 mg/dL (06 Sep 2019 11:58)          ALBUTerol    90 MICROgram(s) HFA Inhaler 1 Puff(s) Inhalation every 4 hours  ALBUTerol/ipratropium for Nebulization 3 milliLiter(s) Nebulizer every 6 hours  aspirin enteric coated 325 milliGRAM(s) Oral daily  atorvastatin 40 milliGRAM(s) Oral at bedtime  chlorhexidine 2% Cloths 1 Application(s) Topical <User Schedule>  clopidogrel Tablet 75 milliGRAM(s) Oral daily  dextrose 50% Injectable 50 milliLiter(s) IV Push every 15 minutes  dextrose 50% Injectable 25 milliLiter(s) IV Push every 15 minutes  docusate sodium 100 milliGRAM(s) Oral three times a day  enoxaparin Injectable 40 milliGRAM(s) SubCutaneous daily  insulin lispro (HumaLOG) corrective regimen sliding scale   SubCutaneous Before meals and at bedtime  metFORMIN 1000 milliGRAM(s) Oral two times a day  metoprolol tartrate 75 milliGRAM(s) Oral <User Schedule>  oxyCODONE    5 mG/acetaminophen 325 mG 1 Tablet(s) Oral every 4 hours PRN  oxyCODONE    5 mG/acetaminophen 325 mG 2 Tablet(s) Oral every 6 hours PRN  pantoprazole    Tablet 40 milliGRAM(s) Oral before breakfast  sodium chloride 0.9% lock flush 3 milliLiter(s) IV Push every 8 hours  sodium chloride 0.9%. 1000 milliLiter(s) IV Continuous <Continuous>  tiotropium 18 MICROgram(s) Capsule 1 Capsule(s) Inhalation daily      PHYSICAL EXAM    Subjective: "Hello."   Neurology: alert and oriented x 3, nonfocal, no gross deficits  CV : tele:  RSR 80-90; +pw- isolated  Sternal Wound :  CDI ANDREW - sternum Stable  Lungs: clear. RR easy, unlabored   Abdomen: soft, nontender, nondistended, positive bowel sounds, + bowel movement   Neg N/V/D   :  pt voiding without difficulty   Extremities:   PAREDES; neg LE edema, neg calf tenderness.   PPP bilaterally; left SVG site cdi andrew; left radial site cdi andrew         PW: + isolated  Chest tubes: none

## 2019-09-07 NOTE — PROGRESS NOTE ADULT - ASSESSMENT
63 M  POD #2  CABGx4.  Hx STEPHANE RCA on plavix.  EF 55%.  Remained in CTU for HD support.  DM2 on metformin and corrective insulin.  +PW VVI 40  postop course unremarkable  9/6 VSS; + exp wheezing- duoneb initiated; ck chest xray 63 M  POD #2  CABGx4.  Hx STEPHANE RCA on plavix.  EF 55%.  Remained in CTU for HD support.  DM2 on metformin and corrective insulin.  +PW VVI 40  postop course unremarkable  9/6 VSS; + exp wheezing- duoneb initiated; ck XRAY; diuresis; lopressor 75 po bid  9/7 VSS; diuresis d/c as per Dr. Hope;   Discharge planning- home pt sun/ mon

## 2019-09-08 ENCOUNTER — TRANSCRIPTION ENCOUNTER (OUTPATIENT)
Age: 64
End: 2019-09-08

## 2019-09-08 VITALS — WEIGHT: 173.72 LBS

## 2019-09-08 LAB
ANION GAP SERPL CALC-SCNC: 14 MMOL/L — SIGNIFICANT CHANGE UP (ref 5–17)
BUN SERPL-MCNC: 39 MG/DL — HIGH (ref 7–23)
CALCIUM SERPL-MCNC: 9 MG/DL — SIGNIFICANT CHANGE UP (ref 8.4–10.5)
CHLORIDE SERPL-SCNC: 95 MMOL/L — LOW (ref 96–108)
CO2 SERPL-SCNC: 26 MMOL/L — SIGNIFICANT CHANGE UP (ref 22–31)
CREAT SERPL-MCNC: 1.39 MG/DL — HIGH (ref 0.5–1.3)
GLUCOSE BLDC GLUCOMTR-MCNC: 141 MG/DL — HIGH (ref 70–99)
GLUCOSE BLDC GLUCOMTR-MCNC: 199 MG/DL — HIGH (ref 70–99)
GLUCOSE SERPL-MCNC: 145 MG/DL — HIGH (ref 70–99)
HCT VFR BLD CALC: 32.6 % — LOW (ref 39–50)
HGB BLD-MCNC: 10.1 G/DL — LOW (ref 13–17)
MCHC RBC-ENTMCNC: 27.9 PG — SIGNIFICANT CHANGE UP (ref 27–34)
MCHC RBC-ENTMCNC: 31 GM/DL — LOW (ref 32–36)
MCV RBC AUTO: 90 FL — SIGNIFICANT CHANGE UP (ref 80–100)
PLATELET # BLD AUTO: 262 K/UL — SIGNIFICANT CHANGE UP (ref 150–400)
POTASSIUM SERPL-MCNC: 4.2 MMOL/L — SIGNIFICANT CHANGE UP (ref 3.5–5.3)
POTASSIUM SERPL-SCNC: 4.2 MMOL/L — SIGNIFICANT CHANGE UP (ref 3.5–5.3)
RBC # BLD: 3.62 M/UL — LOW (ref 4.2–5.8)
RBC # FLD: 13.9 % — SIGNIFICANT CHANGE UP (ref 10.3–14.5)
SODIUM SERPL-SCNC: 135 MMOL/L — SIGNIFICANT CHANGE UP (ref 135–145)
WBC # BLD: 12.6 K/UL — HIGH (ref 3.8–10.5)
WBC # FLD AUTO: 12.6 K/UL — HIGH (ref 3.8–10.5)

## 2019-09-08 RX ORDER — POTASSIUM CHLORIDE 20 MEQ
1 PACKET (EA) ORAL
Qty: 4 | Refills: 0
Start: 2019-09-08 | End: 2019-09-11

## 2019-09-08 RX ORDER — TIOTROPIUM BROMIDE 18 UG/1
1 CAPSULE ORAL; RESPIRATORY (INHALATION)
Qty: 1 | Refills: 0
Start: 2019-09-08 | End: 2019-10-07

## 2019-09-08 RX ORDER — METFORMIN HYDROCHLORIDE 850 MG/1
1 TABLET ORAL
Qty: 0 | Refills: 3 | DISCHARGE

## 2019-09-08 RX ORDER — METFORMIN HYDROCHLORIDE 850 MG/1
1 TABLET ORAL
Qty: 60 | Refills: 0
Start: 2019-09-08 | End: 2019-10-07

## 2019-09-08 RX ORDER — METOPROLOL TARTRATE 50 MG
1 TABLET ORAL
Qty: 60 | Refills: 0
Start: 2019-09-08 | End: 2019-10-07

## 2019-09-08 RX ORDER — ASPIRIN/CALCIUM CARB/MAGNESIUM 324 MG
1 TABLET ORAL
Qty: 30 | Refills: 0
Start: 2019-09-08 | End: 2019-10-07

## 2019-09-08 RX ORDER — ISOPROPYL ALCOHOL, BENZOCAINE .7; .06 ML/ML; ML/ML
1 SWAB TOPICAL
Qty: 1 | Refills: 1
Start: 2019-09-08 | End: 2019-10-27

## 2019-09-08 RX ORDER — CLOPIDOGREL BISULFATE 75 MG/1
1 TABLET, FILM COATED ORAL
Qty: 30 | Refills: 0
Start: 2019-09-08 | End: 2019-10-07

## 2019-09-08 RX ORDER — RANITIDINE HYDROCHLORIDE 150 MG/1
1 TABLET, FILM COATED ORAL
Qty: 0 | Refills: 0 | DISCHARGE

## 2019-09-08 RX ORDER — PANTOPRAZOLE SODIUM 20 MG/1
1 TABLET, DELAYED RELEASE ORAL
Qty: 30 | Refills: 0
Start: 2019-09-08 | End: 2019-10-07

## 2019-09-08 RX ORDER — DOCUSATE SODIUM 100 MG
1 CAPSULE ORAL
Qty: 20 | Refills: 0
Start: 2019-09-08 | End: 2019-09-17

## 2019-09-08 RX ORDER — METOPROLOL TARTRATE 50 MG
1 TABLET ORAL
Qty: 0 | Refills: 0 | DISCHARGE
Start: 2019-09-08

## 2019-09-08 RX ORDER — METOPROLOL TARTRATE 50 MG
1 TABLET ORAL
Qty: 0 | Refills: 0 | DISCHARGE

## 2019-09-08 RX ORDER — FUROSEMIDE 40 MG
1 TABLET ORAL
Qty: 4 | Refills: 0
Start: 2019-09-08 | End: 2019-09-11

## 2019-09-08 RX ORDER — ATORVASTATIN CALCIUM 80 MG/1
1 TABLET, FILM COATED ORAL
Qty: 30 | Refills: 0
Start: 2019-09-08 | End: 2019-10-07

## 2019-09-08 RX ORDER — ACETAMINOPHEN 500 MG
2 TABLET ORAL
Qty: 240 | Refills: 0
Start: 2019-09-08 | End: 2019-10-07

## 2019-09-08 RX ORDER — ALBUTEROL 90 UG/1
1 AEROSOL, METERED ORAL
Qty: 1 | Refills: 0
Start: 2019-09-08 | End: 2019-10-07

## 2019-09-08 RX ADMIN — SODIUM CHLORIDE 3 MILLILITER(S): 9 INJECTION INTRAMUSCULAR; INTRAVENOUS; SUBCUTANEOUS at 14:00

## 2019-09-08 RX ADMIN — Medication 3 MILLILITER(S): at 05:26

## 2019-09-08 RX ADMIN — Medication 100 MILLIGRAM(S): at 05:26

## 2019-09-08 RX ADMIN — Medication 2: at 08:12

## 2019-09-08 RX ADMIN — PANTOPRAZOLE SODIUM 40 MILLIGRAM(S): 20 TABLET, DELAYED RELEASE ORAL at 05:26

## 2019-09-08 RX ADMIN — Medication 3 MILLILITER(S): at 00:09

## 2019-09-08 RX ADMIN — Medication 325 MILLIGRAM(S): at 12:27

## 2019-09-08 RX ADMIN — Medication 100 MILLIGRAM(S): at 12:27

## 2019-09-08 RX ADMIN — Medication 75 MILLIGRAM(S): at 05:27

## 2019-09-08 RX ADMIN — CLOPIDOGREL BISULFATE 75 MILLIGRAM(S): 75 TABLET, FILM COATED ORAL at 12:27

## 2019-09-08 RX ADMIN — CHLORHEXIDINE GLUCONATE 1 APPLICATION(S): 213 SOLUTION TOPICAL at 08:12

## 2019-09-08 RX ADMIN — METFORMIN HYDROCHLORIDE 1000 MILLIGRAM(S): 850 TABLET ORAL at 08:12

## 2019-09-08 RX ADMIN — SODIUM CHLORIDE 3 MILLILITER(S): 9 INJECTION INTRAMUSCULAR; INTRAVENOUS; SUBCUTANEOUS at 05:28

## 2019-09-08 NOTE — DISCHARGE NOTE PROVIDER - CARE PROVIDERS DIRECT ADDRESSES
[email or web address]

## 2019-09-08 NOTE — DISCHARGE NOTE NURSING/CASE MANAGEMENT/SOCIAL WORK - NSSCTYPOFSERV_GEN_ALL_CORE
Visiting nurse will call you to set up 1st appointment, after insurance authorization is obtained, requested for 9/9.

## 2019-09-08 NOTE — DISCHARGE NOTE NURSING/CASE MANAGEMENT/SOCIAL WORK - PATIENT PORTAL LINK FT
You can access the FollowMyHealth Patient Portal offered by [facility] by registering at the following website: [email or web address]. By joining [facility]’s FollowMyHealth portal, you will also be able to view your health information using other applications (apps) compatible with our system.

## 2019-09-08 NOTE — DISCHARGE NOTE PROVIDER - HOSPITAL COURSE
This is a  63 M  PMH DM HTN CAD RCA STEPHANE on plavix    Underwent CABG x4 on 6/3/19 post op course  includes inotropic support for hemodynamic stability      POD #2 tx to floor  CABGx4.  Hx STEPHANE RCA on plavix.  EF 55%.  Remained in CTU for HD support.  DM2 on metformin and corrective insulin.  +PW VVI 40    postop course unremarkable    9/6 VSS; + exp wheezing- duoneb initiated; ck XRAY; diuresis; lopressor 75 po bid    9/7 VSS; diuresis d/c as per Dr. Hope;     9/8 VSS ambulated stable and eager for discharge + BM    Discharge planning- home

## 2019-09-08 NOTE — DISCHARGE NOTE PROVIDER - CARE PROVIDER_API CALL
Chaitanya Hope (MD)  Surgery; Thoracic and Cardiac Surgery  [address] [place] [address]  Phone: [phone]  Fax: [phone]  Follow Up Time:     Kaylee Toro (DO)  Cardiology; Internal Medicine  [address] [place] [address], [address]  [place] [address]  Phone: [phone]  Fax: [phone]  Follow Up Time:

## 2019-09-08 NOTE — DISCHARGE NOTE PROVIDER - NSDCCPCAREPLAN_GEN_ALL_CORE_FT
PRINCIPAL DISCHARGE DIAGNOSIS  Diagnosis: S/P CABG x 4  Assessment and Plan of Treatment: Follow up with Dr Hope within two weeks of diacharge call for appointment on Monday 9/9/19  -[phone]  take all medications as prescribed    1. Daily Shower  2. Weight yourself daily and notify any weight gain greater than 2-3 pounds in 24 hours.  3. Regular diet - low fat, low cholesterol, no added salt.  4. Cleanse Midsternal incision and leg incision daily while showering with warm water and mild soap, pat dry and maintain open to air.   5. Follow Cardiac Surgery Do's and Don'ts discharge instructions.   6. No driving until cleared by MD.   7. No heavy lifting nothing greater than 5 pounds until cleared by MD.   8. Call / Notify MD any fever greater than 101.0  9. Increase Activity as tolerated.  10. Call your cardiologist  Dr Toro for appointment within 7-10 days  11. Call our office for fever chills or any concerns

## 2019-09-09 ENCOUNTER — APPOINTMENT (OUTPATIENT)
Dept: CARE COORDINATION | Facility: HOME HEALTH | Age: 64
End: 2019-09-09
Payer: MEDICAID

## 2019-09-09 VITALS
OXYGEN SATURATION: 96 % | RESPIRATION RATE: 16 BRPM | DIASTOLIC BLOOD PRESSURE: 74 MMHG | SYSTOLIC BLOOD PRESSURE: 124 MMHG | HEART RATE: 100 BPM

## 2019-09-09 PROCEDURE — 99024 POSTOP FOLLOW-UP VISIT: CPT

## 2019-09-09 RX ORDER — METFORMIN HYDROCHLORIDE 500 MG/1
500 TABLET, COATED ORAL TWICE DAILY
Qty: 180 | Refills: 0 | Status: DISCONTINUED | COMMUNITY
Start: 2019-08-28 | End: 2019-09-09

## 2019-09-09 RX ORDER — RANITIDINE 300 MG/1
300 TABLET ORAL
Qty: 1 | Refills: 1 | Status: DISCONTINUED | COMMUNITY
Start: 2019-08-28 | End: 2019-09-09

## 2019-09-09 RX ORDER — ENALAPRIL MALEATE 5 MG/1
5 TABLET ORAL DAILY
Refills: 0 | Status: DISCONTINUED | COMMUNITY
Start: 2019-08-28 | End: 2019-09-09

## 2019-09-09 RX ORDER — ASPIRIN ENTERIC COATED TABLETS 81 MG 81 MG/1
81 TABLET, DELAYED RELEASE ORAL
Qty: 30 | Refills: 1 | Status: DISCONTINUED | COMMUNITY
Start: 2019-08-28 | End: 2019-09-09

## 2019-09-09 RX ORDER — METOPROLOL SUCCINATE 50 MG/1
50 TABLET, EXTENDED RELEASE ORAL
Qty: 90 | Refills: 2 | Status: DISCONTINUED | COMMUNITY
Start: 2019-08-28 | End: 2019-09-09

## 2019-09-09 NOTE — HISTORY OF PRESENT ILLNESS
[FreeTextEntry1] : 63M s/p CABG Dr. Hope, lives with family, c/o constipation and SOB with wheezing last night improved today with spiriva and albuterol

## 2019-09-09 NOTE — PHYSICAL EXAM
[Respiration, Rhythm And Depth] : normal respiratory rhythm and effort [Exaggerated Use Of Accessory Muscles For Inspiration] : no accessory muscle use [Heart Rate And Rhythm] : heart rate was normal and rhythm regular [FreeTextEntry1] : MSI, CT sites and SVG sites  and L radial artery graft site, without erythema, drainage or warmth, with edges well approximated.  Sternum stable. BLE minimal edema [Heart Sounds] : normal S1 and S2 [Bowel Sounds] : normal bowel sounds [Abdomen Soft] : soft [Abdomen Tenderness] : non-tender

## 2019-09-09 NOTE — REVIEW OF SYSTEMS
[Wheezing] : wheezing [Shortness Of Breath] : shortness of breath [Cough] : no cough [Constipation] : constipation [Negative] : Cardiovascular

## 2019-09-11 NOTE — CHART NOTE - NSCHARTNOTEFT_GEN_A_CORE
This patient post operatively had KIRK, now resolved.   This patient post op had mild acute pulmonary congestion requiring IV lasix intermittently.   This patient had hypophosphatemia requiring supplementation.

## 2019-09-20 ENCOUNTER — NON-APPOINTMENT (OUTPATIENT)
Age: 64
End: 2019-09-20

## 2019-09-20 ENCOUNTER — APPOINTMENT (OUTPATIENT)
Dept: CARDIOTHORACIC SURGERY | Facility: CLINIC | Age: 64
End: 2019-09-20
Payer: MEDICAID

## 2019-09-20 VITALS
OXYGEN SATURATION: 97 % | HEIGHT: 65 IN | SYSTOLIC BLOOD PRESSURE: 107 MMHG | WEIGHT: 164 LBS | RESPIRATION RATE: 16 BRPM | DIASTOLIC BLOOD PRESSURE: 69 MMHG | HEART RATE: 87 BPM | TEMPERATURE: 97.7 F | BODY MASS INDEX: 27.32 KG/M2

## 2019-09-20 PROCEDURE — 99024 POSTOP FOLLOW-UP VISIT: CPT

## 2019-09-20 RX ORDER — METOPROLOL TARTRATE 75 MG/1
75 TABLET, FILM COATED ORAL TWICE DAILY
Refills: 0 | Status: COMPLETED | COMMUNITY
Start: 2019-09-09 | End: 2019-09-20

## 2019-09-23 ENCOUNTER — APPOINTMENT (OUTPATIENT)
Dept: CARDIOTHORACIC SURGERY | Facility: CLINIC | Age: 64
End: 2019-09-23
Payer: MEDICAID

## 2019-09-23 ENCOUNTER — NON-APPOINTMENT (OUTPATIENT)
Age: 64
End: 2019-09-23

## 2019-09-23 ENCOUNTER — APPOINTMENT (OUTPATIENT)
Dept: CARDIOTHORACIC SURGERY | Facility: CLINIC | Age: 64
End: 2019-09-23

## 2019-09-23 VITALS — BODY MASS INDEX: 26.96 KG/M2 | WEIGHT: 162 LBS

## 2019-09-23 VITALS
SYSTOLIC BLOOD PRESSURE: 126 MMHG | RESPIRATION RATE: 16 BRPM | DIASTOLIC BLOOD PRESSURE: 75 MMHG | HEART RATE: 103 BPM | BODY MASS INDEX: 27.32 KG/M2 | HEIGHT: 65 IN | WEIGHT: 164 LBS | OXYGEN SATURATION: 99 %

## 2019-09-23 VITALS — TEMPERATURE: 98.5 F

## 2019-09-23 PROCEDURE — 99024 POSTOP FOLLOW-UP VISIT: CPT

## 2019-10-07 ENCOUNTER — APPOINTMENT (OUTPATIENT)
Dept: CARDIOTHORACIC SURGERY | Facility: CLINIC | Age: 64
End: 2019-10-07
Payer: MEDICAID

## 2019-10-07 VITALS
TEMPERATURE: 98.2 F | WEIGHT: 158 LBS | HEIGHT: 65 IN | RESPIRATION RATE: 14 BRPM | DIASTOLIC BLOOD PRESSURE: 84 MMHG | HEART RATE: 102 BPM | SYSTOLIC BLOOD PRESSURE: 125 MMHG | BODY MASS INDEX: 26.33 KG/M2 | OXYGEN SATURATION: 99 %

## 2019-10-07 PROCEDURE — 99024 POSTOP FOLLOW-UP VISIT: CPT

## 2019-10-09 PROBLEM — Z09 POSTOP CHECK: Status: ACTIVE | Noted: 2019-09-20

## 2019-10-09 PROBLEM — Z95.1 S/P CABG X 4: Status: ACTIVE | Noted: 2019-09-20

## 2019-10-14 ENCOUNTER — APPOINTMENT (OUTPATIENT)
Dept: CARDIOTHORACIC SURGERY | Facility: CLINIC | Age: 64
End: 2019-10-14
Payer: MEDICAID

## 2019-10-14 VITALS
RESPIRATION RATE: 16 BRPM | SYSTOLIC BLOOD PRESSURE: 129 MMHG | OXYGEN SATURATION: 100 % | DIASTOLIC BLOOD PRESSURE: 82 MMHG | BODY MASS INDEX: 26.49 KG/M2 | WEIGHT: 159 LBS | HEIGHT: 65 IN | HEART RATE: 100 BPM | TEMPERATURE: 97.8 F

## 2019-10-14 DIAGNOSIS — Z95.1 PRESENCE OF AORTOCORONARY BYPASS GRAFT: ICD-10-CM

## 2019-10-14 DIAGNOSIS — Z09 ENCOUNTER FOR FOLLOW-UP EXAMINATION AFTER COMPLETED TREATMENT FOR CONDITIONS OTHER THAN MALIGNANT NEOPLASM: ICD-10-CM

## 2019-10-14 PROCEDURE — 99024 POSTOP FOLLOW-UP VISIT: CPT

## 2019-10-14 RX ORDER — ASPIRIN 325 MG/1
325 TABLET, FILM COATED ORAL DAILY
Qty: 30 | Refills: 0 | Status: ACTIVE | COMMUNITY
Start: 2019-09-09 | End: 1900-01-01

## 2019-10-14 RX ORDER — DOXYCYCLINE 100 MG/1
100 CAPSULE ORAL
Qty: 14 | Refills: 0 | Status: COMPLETED | COMMUNITY
Start: 2019-10-07 | End: 2019-10-14

## 2019-10-14 RX ORDER — ALBUTEROL SULFATE 90 UG/1
108 (90 BASE) INHALANT RESPIRATORY (INHALATION) EVERY 4 HOURS
Qty: 10 | Refills: 0 | Status: COMPLETED | COMMUNITY
Start: 2019-09-09 | End: 2019-10-14

## 2019-10-14 RX ORDER — METFORMIN HYDROCHLORIDE 1000 MG/1
1000 TABLET, COATED ORAL
Qty: 120 | Refills: 0 | Status: ACTIVE | COMMUNITY
Start: 2019-09-09 | End: 1900-01-01

## 2019-10-14 RX ORDER — DOCUSATE SODIUM 100 MG/1
100 CAPSULE, LIQUID FILLED ORAL TWICE DAILY
Qty: 60 | Refills: 0 | Status: ACTIVE | COMMUNITY
Start: 2019-09-09 | End: 1900-01-01

## 2019-10-14 RX ORDER — ATORVASTATIN CALCIUM 80 MG/1
80 TABLET, FILM COATED ORAL
Qty: 30 | Refills: 0 | Status: ACTIVE | COMMUNITY
Start: 2019-08-28 | End: 1900-01-01

## 2019-10-14 RX ORDER — METOPROLOL SUCCINATE 100 MG/1
100 TABLET, EXTENDED RELEASE ORAL DAILY
Qty: 30 | Refills: 2 | Status: ACTIVE | COMMUNITY
Start: 2019-09-20 | End: 1900-01-01

## 2019-10-14 RX ORDER — TIOTROPIUM BROMIDE 18 UG/1
18 CAPSULE ORAL; RESPIRATORY (INHALATION) DAILY
Qty: 60 | Refills: 0 | Status: ACTIVE | COMMUNITY
Start: 2019-09-09 | End: 1900-01-01

## 2019-10-14 RX ORDER — HYDROCODONE BITARTRATE AND HOMATROPINE METHYLBROMIDE 5; 1.5 MG/5ML; MG/5ML
5-1.5 SYRUP ORAL EVERY 6 HOURS
Qty: 300 | Refills: 0 | Status: ACTIVE | COMMUNITY
Start: 2019-10-14 | End: 1900-01-01

## 2019-10-31 PROCEDURE — 85730 THROMBOPLASTIN TIME PARTIAL: CPT

## 2019-10-31 PROCEDURE — 71045 X-RAY EXAM CHEST 1 VIEW: CPT

## 2019-10-31 PROCEDURE — 86923 COMPATIBILITY TEST ELECTRIC: CPT

## 2019-10-31 PROCEDURE — 86901 BLOOD TYPING SEROLOGIC RH(D): CPT

## 2019-10-31 PROCEDURE — C1769: CPT

## 2019-10-31 PROCEDURE — 82553 CREATINE MB FRACTION: CPT

## 2019-10-31 PROCEDURE — 86900 BLOOD TYPING SEROLOGIC ABO: CPT

## 2019-10-31 PROCEDURE — 80048 BASIC METABOLIC PNL TOTAL CA: CPT

## 2019-10-31 PROCEDURE — 84443 ASSAY THYROID STIM HORMONE: CPT

## 2019-10-31 PROCEDURE — 86891 AUTOLOGOUS BLOOD OP SALVAGE: CPT

## 2019-10-31 PROCEDURE — C1751: CPT

## 2019-10-31 PROCEDURE — 82962 GLUCOSE BLOOD TEST: CPT

## 2019-10-31 PROCEDURE — 93005 ELECTROCARDIOGRAM TRACING: CPT

## 2019-10-31 PROCEDURE — 97116 GAIT TRAINING THERAPY: CPT

## 2019-10-31 PROCEDURE — P9016: CPT

## 2019-10-31 PROCEDURE — 94002 VENT MGMT INPAT INIT DAY: CPT

## 2019-10-31 PROCEDURE — 85014 HEMATOCRIT: CPT

## 2019-10-31 PROCEDURE — P9045: CPT

## 2019-10-31 PROCEDURE — 85384 FIBRINOGEN ACTIVITY: CPT

## 2019-10-31 PROCEDURE — P9047: CPT

## 2019-10-31 PROCEDURE — 84484 ASSAY OF TROPONIN QUANT: CPT

## 2019-10-31 PROCEDURE — P9037: CPT

## 2019-10-31 PROCEDURE — 82330 ASSAY OF CALCIUM: CPT

## 2019-10-31 PROCEDURE — 84100 ASSAY OF PHOSPHORUS: CPT

## 2019-10-31 PROCEDURE — 84295 ASSAY OF SERUM SODIUM: CPT

## 2019-10-31 PROCEDURE — 84132 ASSAY OF SERUM POTASSIUM: CPT

## 2019-10-31 PROCEDURE — 82947 ASSAY GLUCOSE BLOOD QUANT: CPT

## 2019-10-31 PROCEDURE — 82565 ASSAY OF CREATININE: CPT

## 2019-10-31 PROCEDURE — 94640 AIRWAY INHALATION TREATMENT: CPT

## 2019-10-31 PROCEDURE — 36430 TRANSFUSION BLD/BLD COMPNT: CPT

## 2019-10-31 PROCEDURE — 83605 ASSAY OF LACTIC ACID: CPT

## 2019-10-31 PROCEDURE — 83735 ASSAY OF MAGNESIUM: CPT

## 2019-10-31 PROCEDURE — 85027 COMPLETE CBC AUTOMATED: CPT

## 2019-10-31 PROCEDURE — 82803 BLOOD GASES ANY COMBINATION: CPT

## 2019-10-31 PROCEDURE — 80053 COMPREHEN METABOLIC PANEL: CPT

## 2019-10-31 PROCEDURE — C1889: CPT

## 2019-10-31 PROCEDURE — 82435 ASSAY OF BLOOD CHLORIDE: CPT

## 2019-10-31 PROCEDURE — 82550 ASSAY OF CK (CPK): CPT

## 2019-10-31 PROCEDURE — 85610 PROTHROMBIN TIME: CPT

## 2019-10-31 PROCEDURE — 97161 PT EVAL LOW COMPLEX 20 MIN: CPT

## 2019-12-13 DIAGNOSIS — R06.02 SHORTNESS OF BREATH: ICD-10-CM

## 2020-03-04 LAB
ALBUMIN SERPL ELPH-MCNC: 4.2 G/DL
ALBUMIN SERPL ELPH-MCNC: 4.2 G/DL
ALP BLD-CCNC: 81 U/L
ALP BLD-CCNC: 86 U/L
ALT SERPL-CCNC: 137 U/L
ALT SERPL-CCNC: 40 U/L
AMYLASE/CREAT SERPL: 142 U/L
AMYLASE/CREAT SERPL: 146 U/L
ANION GAP SERPL CALC-SCNC: 14 MMOL/L
ANION GAP SERPL CALC-SCNC: 16 MMOL/L
AST SERPL-CCNC: 25 U/L
AST SERPL-CCNC: 79 U/L
BASOPHILS # BLD AUTO: 0.02 K/UL
BASOPHILS # BLD AUTO: 0.1 K/UL
BASOPHILS NFR BLD AUTO: 0.1 %
BASOPHILS NFR BLD AUTO: 0.7 %
BILIRUB SERPL-MCNC: 0.4 MG/DL
BILIRUB SERPL-MCNC: 0.7 MG/DL
BUN SERPL-MCNC: 27 MG/DL
BUN SERPL-MCNC: 27 MG/DL
CALCIUM SERPL-MCNC: 10 MG/DL
CALCIUM SERPL-MCNC: 9.9 MG/DL
CHLORIDE SERPL-SCNC: 94 MMOL/L
CHLORIDE SERPL-SCNC: 97 MMOL/L
CO2 SERPL-SCNC: 20 MMOL/L
CO2 SERPL-SCNC: 21 MMOL/L
CREAT SERPL-MCNC: 1.17 MG/DL
CREAT SERPL-MCNC: 1.39 MG/DL
EOSINOPHIL # BLD AUTO: 0.07 K/UL
EOSINOPHIL # BLD AUTO: 0.3 K/UL
EOSINOPHIL NFR BLD AUTO: 0.4 %
EOSINOPHIL NFR BLD AUTO: 3.2 %
GLUCOSE SERPL-MCNC: 174 MG/DL
GLUCOSE SERPL-MCNC: 228 MG/DL
HCT VFR BLD CALC: 34.2 %
HCT VFR BLD CALC: 34.8 %
HGB BLD-MCNC: 10.5 G/DL
HGB BLD-MCNC: 10.9 G/DL
LPL SERPL-CCNC: 202 U/L
LPL SERPL-CCNC: 240 U/L
LYMPHOCYTES # BLD AUTO: 1.92 K/UL
LYMPHOCYTES # BLD AUTO: 2.1 K/UL
LYMPHOCYTES NFR BLD AUTO: 11.6 %
LYMPHOCYTES NFR BLD AUTO: 20.5 %
MAN DIFF?: NO
MAN DIFF?: NO
MCHC RBC-ENTMCNC: 27.1 PG
MCHC RBC-ENTMCNC: 27.7 PG
MCHC RBC-ENTMCNC: 30.7 GM/DL
MCHC RBC-ENTMCNC: 31.4 GM/DL
MCV RBC AUTO: 88.4 FL
MCV RBC AUTO: 88.4 FL
MONOCYTES # BLD AUTO: 0.8 K/UL
MONOCYTES # BLD AUTO: 1.08 K/UL
MONOCYTES NFR BLD AUTO: 6.5 %
MONOCYTES NFR BLD AUTO: 7.3 %
NEUTROPHILS # BLD AUTO: 13.5 K/UL
NEUTROPHILS # BLD AUTO: 7.1 K/UL
NEUTROPHILS NFR BLD AUTO: 68.3 %
NEUTROPHILS NFR BLD AUTO: 81.4 %
PLATELET # BLD AUTO: 591 K/UL
PLATELET # BLD AUTO: 663 K/UL
POTASSIUM SERPL-SCNC: 4.8 MMOL/L
POTASSIUM SERPL-SCNC: 5 MMOL/L
PROT SERPL-MCNC: 8.3 G/DL
PROT SERPL-MCNC: 8.7 G/DL
RBC # BLD: 3.87 M/UL
RBC # BLD: 3.94 M/UL
RBC # FLD: 13.9 %
RBC # FLD: 14.1 %
SODIUM SERPL-SCNC: 128 MMOL/L
SODIUM SERPL-SCNC: 134 MMOL/L
WBC # FLD AUTO: 10.4 K/UL
WBC # FLD AUTO: 16.6 K/UL

## 2021-01-06 NOTE — DISCHARGE NOTE PROVIDER - NSDCPNSUBOBJ_GEN_ALL_CORE
Reviewed options PST/IVT/DROPS and patient wants to proceed with DROPS. Axo x3    NSR 90s     S1 S2 RRR    MTI dermabond     staples on chest tube site    lungs b/l CTA breath  sounds on room air      ab protuberant soft nontender + BM     voids      b/lle warm well perfused + DP no calf tenderness     Left radial harvest Dermabond  site benign      equal strength all extremities warm well perfused    T(C): 37.2 (09-08-19 @ 04:38), Max: 37.2 (09-07-19 @ 13:37)    T(F): 99 (09-08-19 @ 04:38), Max: 99 (09-07-19 @ 13:37)    HR: 99 (09-08-19 @ 04:38) (89 - 99)    BP: 138/85 (09-08-19 @ 04:38) (101/64 - 138/85)    RR: 18 (09-08-19 @ 04:38) (18 - 18)    SpO2: 94% (09-08-19 @ 04:38) (94% - 97%)         weight 78.8 kg    Stable ambulated and eager for discharge    Greater then 45 minutes spent on discharge

## 2021-08-17 ENCOUNTER — APPOINTMENT (OUTPATIENT)
Dept: VASCULAR SURGERY | Facility: CLINIC | Age: 66
End: 2021-08-17
Payer: MEDICAID

## 2021-08-17 VITALS — DIASTOLIC BLOOD PRESSURE: 67 MMHG | SYSTOLIC BLOOD PRESSURE: 132 MMHG

## 2021-08-17 VITALS — DIASTOLIC BLOOD PRESSURE: 69 MMHG | SYSTOLIC BLOOD PRESSURE: 99 MMHG

## 2021-08-17 PROCEDURE — 99203 OFFICE O/P NEW LOW 30 MIN: CPT

## 2021-08-17 NOTE — CONSULT LETTER
[Dear  ___] : Dear  [unfilled], [Consult Letter:] : I had the pleasure of evaluating your patient, [unfilled]. [Please see my note below.] : Please see my note below. [Consult Closing:] : Thank you very much for allowing me to participate in the care of this patient.  If you have any questions, please do not hesitate to contact me. [Sincerely,] : Sincerely, [FreeTextEntry3] : Desmond Sanders M.D., JESSICA., R.P.V.I.\par \par  [facility]\par  of  Surgery\par Assistant Professor of Radiology\par Vascular Surgery at [place]

## 2021-08-17 NOTE — REASON FOR VISIT
[Consultation] : a consultation visit [FreeTextEntry1] : Blood pressure discrepancy upper extremities

## 2021-08-17 NOTE — HISTORY OF PRESENT ILLNESS
[FreeTextEntry1] : 65-year-old gentleman history of diabetes, status post CABG with harvest of the left radial artery 2 years ago presents with a blood pressure discrepancy between both upper extremities.  Patient has no history of dizziness, syncope or arm claudication.  There is no history of stroke or TIA.  Patient is here for evaluation.

## 2021-08-17 NOTE — ASSESSMENT
[FreeTextEntry1] : Patient previously had a carotid duplex study which showed no significant disease and both vertebral arteries were antegrade in flow.  At this time patient without symptoms.  No need for intervention.  I have instructed patient to always be mindful that the blood pressure in the left arm is the correct blood pressure.  He may follow-up with me as needed.

## 2021-08-17 NOTE — PHYSICAL EXAM
[JVD] : no jugular venous distention  [Normal Breath Sounds] : Normal breath sounds [Normal Rate and Rhythm] : normal rate and rhythm [2+] : left 2+ [0] : left 0 [Ankle Swelling (On Exam)] : not present [Varicose Veins Of Lower Extremities] : not present [] : not present [Abdomen Tenderness] : ~T ~M No abdominal tenderness [No Rash or Lesion] : No rash or lesion [Skin Ulcer] : no ulcer [Alert] : alert [Calm] : calm [de-identified] : Appears well

## 2022-09-26 NOTE — PHYSICAL THERAPY INITIAL EVALUATION ADULT - IMPAIRMENTS CONTRIBUTING IMPAIRED BED MOBILITY, REHAB EVAL
Spoke with patient and scheduled follow up with Dr. Myers on Thursday 10/20 at 10AM.   pain/decreased strength